# Patient Record
Sex: FEMALE | Race: BLACK OR AFRICAN AMERICAN | NOT HISPANIC OR LATINO
[De-identification: names, ages, dates, MRNs, and addresses within clinical notes are randomized per-mention and may not be internally consistent; named-entity substitution may affect disease eponyms.]

---

## 2018-11-06 ENCOUNTER — RESULT REVIEW (OUTPATIENT)
Age: 43
End: 2018-11-06

## 2018-11-06 ENCOUNTER — LABORATORY RESULT (OUTPATIENT)
Age: 43
End: 2018-11-06

## 2018-11-06 ENCOUNTER — OUTPATIENT (OUTPATIENT)
Dept: OUTPATIENT SERVICES | Facility: HOSPITAL | Age: 43
LOS: 1 days | End: 2018-11-06

## 2018-11-06 ENCOUNTER — APPOINTMENT (OUTPATIENT)
Dept: OBGYN | Facility: HOSPITAL | Age: 43
End: 2018-11-06
Payer: MEDICAID

## 2018-11-06 VITALS
SYSTOLIC BLOOD PRESSURE: 105 MMHG | DIASTOLIC BLOOD PRESSURE: 58 MMHG | BODY MASS INDEX: 19.93 KG/M2 | WEIGHT: 127 LBS | HEART RATE: 94 BPM | HEIGHT: 67 IN

## 2018-11-06 PROCEDURE — 99386 PREV VISIT NEW AGE 40-64: CPT | Mod: GC

## 2018-11-06 PROCEDURE — 99214 OFFICE O/P EST MOD 30 MIN: CPT | Mod: GC,25

## 2018-11-07 DIAGNOSIS — R10.2 PELVIC AND PERINEAL PAIN: ICD-10-CM

## 2018-11-07 DIAGNOSIS — Z01.419 ENCOUNTER FOR GYNECOLOGICAL EXAMINATION (GENERAL) (ROUTINE) WITHOUT ABNORMAL FINDINGS: ICD-10-CM

## 2018-11-07 LAB
C TRACH RRNA SPEC QL NAA+PROBE: SIGNIFICANT CHANGE UP
CANDIDA AB TITR SER: NOT DETECTED — SIGNIFICANT CHANGE UP
G VAGINALIS DNA SPEC QL NAA+PROBE: DETECTED — SIGNIFICANT CHANGE UP
HBV SURFACE AB SER-ACNC: REACTIVE — SIGNIFICANT CHANGE UP
HBV SURFACE AG SER-ACNC: NONREACTIVE — SIGNIFICANT CHANGE UP
HCV AB S/CO SERPL IA: 0.2 S/CO — SIGNIFICANT CHANGE UP
HCV AB SERPL-IMP: SIGNIFICANT CHANGE UP
HCV RNA SERPL NAA DL=5-ACNC: NOT DETECTED — SIGNIFICANT CHANGE UP
HCV RNA SPEC NAA+PROBE-LOG IU: SIGNIFICANT CHANGE UP LOGIU/ML
HIV 1+2 AB+HIV1 P24 AG SERPL QL IA: SIGNIFICANT CHANGE UP
HPV HIGH+LOW RISK DNA PNL CVX: SIGNIFICANT CHANGE UP
N GONORRHOEA RRNA SPEC QL NAA+PROBE: SIGNIFICANT CHANGE UP
SPECIMEN SOURCE: SIGNIFICANT CHANGE UP
T PALLIDUM AB TITR SER: NEGATIVE — SIGNIFICANT CHANGE UP
T VAGINALIS SPEC QL WET PREP: NOT DETECTED — SIGNIFICANT CHANGE UP

## 2018-11-09 LAB — CYTOLOGY SPEC DOC CYTO: SIGNIFICANT CHANGE UP

## 2018-12-04 ENCOUNTER — APPOINTMENT (OUTPATIENT)
Dept: OBGYN | Facility: HOSPITAL | Age: 43
End: 2018-12-04

## 2019-01-16 ENCOUNTER — LABORATORY RESULT (OUTPATIENT)
Age: 44
End: 2019-01-16

## 2019-01-16 ENCOUNTER — APPOINTMENT (OUTPATIENT)
Dept: INTERNAL MEDICINE | Facility: HOSPITAL | Age: 44
End: 2019-01-16
Payer: MEDICAID

## 2019-01-16 ENCOUNTER — OUTPATIENT (OUTPATIENT)
Dept: OUTPATIENT SERVICES | Facility: HOSPITAL | Age: 44
LOS: 1 days | End: 2019-01-16

## 2019-01-16 VITALS — BODY MASS INDEX: 23.55 KG/M2 | HEIGHT: 62 IN | WEIGHT: 128 LBS

## 2019-01-16 VITALS — HEART RATE: 70 BPM | DIASTOLIC BLOOD PRESSURE: 70 MMHG | SYSTOLIC BLOOD PRESSURE: 105 MMHG

## 2019-01-16 DIAGNOSIS — B96.89 ACUTE VAGINITIS: ICD-10-CM

## 2019-01-16 DIAGNOSIS — Z00.00 ENCOUNTER FOR GENERAL ADULT MEDICAL EXAMINATION W/OUT ABNORMAL FINDINGS: ICD-10-CM

## 2019-01-16 DIAGNOSIS — N83.291 OTHER OVARIAN CYST, RIGHT SIDE: ICD-10-CM

## 2019-01-16 DIAGNOSIS — N76.0 ACUTE VAGINITIS: ICD-10-CM

## 2019-01-16 DIAGNOSIS — R10.30 LOWER ABDOMINAL PAIN, UNSPECIFIED: ICD-10-CM

## 2019-01-16 LAB
ALBUMIN SERPL ELPH-MCNC: 5 G/DL — SIGNIFICANT CHANGE UP (ref 3.3–5)
ALP SERPL-CCNC: 60 U/L — SIGNIFICANT CHANGE UP (ref 40–120)
ALT FLD-CCNC: 27 U/L — SIGNIFICANT CHANGE UP (ref 4–33)
ANION GAP SERPL CALC-SCNC: 16 MMO/L — HIGH (ref 7–14)
AST SERPL-CCNC: 24 U/L — SIGNIFICANT CHANGE UP (ref 4–32)
BASOPHILS # BLD AUTO: 0.03 K/UL — SIGNIFICANT CHANGE UP (ref 0–0.2)
BASOPHILS NFR BLD AUTO: 0.7 % — SIGNIFICANT CHANGE UP (ref 0–2)
BILIRUB SERPL-MCNC: 0.3 MG/DL — SIGNIFICANT CHANGE UP (ref 0.2–1.2)
BUN SERPL-MCNC: 7 MG/DL — SIGNIFICANT CHANGE UP (ref 7–23)
CALCIUM SERPL-MCNC: 10.1 MG/DL — SIGNIFICANT CHANGE UP (ref 8.4–10.5)
CHLORIDE SERPL-SCNC: 100 MMOL/L — SIGNIFICANT CHANGE UP (ref 98–107)
CHOLEST SERPL-MCNC: 202 MG/DL — HIGH (ref 120–199)
CO2 SERPL-SCNC: 26 MMOL/L — SIGNIFICANT CHANGE UP (ref 22–31)
CREAT SERPL-MCNC: 0.83 MG/DL — SIGNIFICANT CHANGE UP (ref 0.5–1.3)
EOSINOPHIL # BLD AUTO: 0.03 K/UL — SIGNIFICANT CHANGE UP (ref 0–0.5)
EOSINOPHIL NFR BLD AUTO: 0.7 % — SIGNIFICANT CHANGE UP (ref 0–6)
GLUCOSE SERPL-MCNC: 86 MG/DL — SIGNIFICANT CHANGE UP (ref 70–99)
HBA1C BLD-MCNC: 5.7 % — HIGH (ref 4–5.6)
HCT VFR BLD CALC: 39.7 % — SIGNIFICANT CHANGE UP (ref 34.5–45)
HDLC SERPL-MCNC: 61 MG/DL — SIGNIFICANT CHANGE UP (ref 45–65)
HGB BLD-MCNC: 12.2 G/DL — SIGNIFICANT CHANGE UP (ref 11.5–15.5)
IMM GRANULOCYTES NFR BLD AUTO: 0.2 % — SIGNIFICANT CHANGE UP (ref 0–1.5)
LIPID PNL WITH DIRECT LDL SERPL: 141 MG/DL — SIGNIFICANT CHANGE UP
LYMPHOCYTES # BLD AUTO: 1.47 K/UL — SIGNIFICANT CHANGE UP (ref 1–3.3)
LYMPHOCYTES # BLD AUTO: 36.6 % — SIGNIFICANT CHANGE UP (ref 13–44)
MAGNESIUM SERPL-MCNC: 1.9 MG/DL — SIGNIFICANT CHANGE UP (ref 1.6–2.6)
MCHC RBC-ENTMCNC: 27.2 PG — SIGNIFICANT CHANGE UP (ref 27–34)
MCHC RBC-ENTMCNC: 30.7 % — LOW (ref 32–36)
MCV RBC AUTO: 88.6 FL — SIGNIFICANT CHANGE UP (ref 80–100)
MONOCYTES # BLD AUTO: 0.3 K/UL — SIGNIFICANT CHANGE UP (ref 0–0.9)
MONOCYTES NFR BLD AUTO: 7.5 % — SIGNIFICANT CHANGE UP (ref 2–14)
NEUTROPHILS # BLD AUTO: 2.18 K/UL — SIGNIFICANT CHANGE UP (ref 1.8–7.4)
NEUTROPHILS NFR BLD AUTO: 54.3 % — SIGNIFICANT CHANGE UP (ref 43–77)
NRBC # FLD: 0 K/UL — LOW (ref 25–125)
PLATELET # BLD AUTO: 251 K/UL — SIGNIFICANT CHANGE UP (ref 150–400)
PMV BLD: 11.4 FL — SIGNIFICANT CHANGE UP (ref 7–13)
POTASSIUM SERPL-MCNC: 4.1 MMOL/L — SIGNIFICANT CHANGE UP (ref 3.5–5.3)
POTASSIUM SERPL-SCNC: 4.1 MMOL/L — SIGNIFICANT CHANGE UP (ref 3.5–5.3)
PROT SERPL-MCNC: 8.5 G/DL — HIGH (ref 6–8.3)
RBC # BLD: 4.48 M/UL — SIGNIFICANT CHANGE UP (ref 3.8–5.2)
RBC # FLD: 13.4 % — SIGNIFICANT CHANGE UP (ref 10.3–14.5)
SODIUM SERPL-SCNC: 142 MMOL/L — SIGNIFICANT CHANGE UP (ref 135–145)
TRIGL SERPL-MCNC: 83 MG/DL — SIGNIFICANT CHANGE UP (ref 10–149)
WBC # BLD: 4.02 K/UL — SIGNIFICANT CHANGE UP (ref 3.8–10.5)
WBC # FLD AUTO: 4.02 K/UL — SIGNIFICANT CHANGE UP (ref 3.8–10.5)

## 2019-01-16 PROCEDURE — 99204 OFFICE O/P NEW MOD 45 MIN: CPT | Mod: GC

## 2019-01-16 RX ORDER — IBUPROFEN 200 MG/1
200 TABLET ORAL 3 TIMES DAILY
Refills: 0 | Status: ACTIVE | COMMUNITY
Start: 2019-01-16

## 2019-01-16 RX ORDER — METRONIDAZOLE 500 MG/1
500 TABLET ORAL TWICE DAILY
Qty: 14 | Refills: 0 | Status: COMPLETED | COMMUNITY
Start: 2018-11-13 | End: 2018-11-29

## 2019-01-17 PROBLEM — N76.0 BACTERIAL VAGINOSIS: Status: RESOLVED | Noted: 2018-11-13 | Resolved: 2019-01-17

## 2019-01-17 PROBLEM — Z00.00 ENCOUNTER FOR PREVENTIVE HEALTH EXAMINATION: Status: ACTIVE | Noted: 2018-10-01

## 2019-01-17 PROBLEM — R10.30 LOWER ABDOMINAL PAIN: Status: ACTIVE | Noted: 2019-01-17

## 2019-01-19 ENCOUNTER — APPOINTMENT (OUTPATIENT)
Dept: MAMMOGRAPHY | Facility: IMAGING CENTER | Age: 44
End: 2019-01-19
Payer: COMMERCIAL

## 2019-01-19 ENCOUNTER — OUTPATIENT (OUTPATIENT)
Dept: OUTPATIENT SERVICES | Facility: HOSPITAL | Age: 44
LOS: 1 days | End: 2019-01-19
Payer: COMMERCIAL

## 2019-01-19 ENCOUNTER — APPOINTMENT (OUTPATIENT)
Dept: ULTRASOUND IMAGING | Facility: IMAGING CENTER | Age: 44
End: 2019-01-19

## 2019-01-19 DIAGNOSIS — Z01.419 ENCOUNTER FOR GYNECOLOGICAL EXAMINATION (GENERAL) (ROUTINE) WITHOUT ABNORMAL FINDINGS: ICD-10-CM

## 2019-01-19 PROCEDURE — 77063 BREAST TOMOSYNTHESIS BI: CPT

## 2019-01-19 PROCEDURE — 77063 BREAST TOMOSYNTHESIS BI: CPT | Mod: 26

## 2019-01-19 PROCEDURE — 77067 SCR MAMMO BI INCL CAD: CPT | Mod: 26

## 2019-01-19 PROCEDURE — 77067 SCR MAMMO BI INCL CAD: CPT

## 2019-01-26 ENCOUNTER — APPOINTMENT (OUTPATIENT)
Dept: ULTRASOUND IMAGING | Facility: IMAGING CENTER | Age: 44
End: 2019-01-26
Payer: COMMERCIAL

## 2019-01-26 ENCOUNTER — OUTPATIENT (OUTPATIENT)
Dept: OUTPATIENT SERVICES | Facility: HOSPITAL | Age: 44
LOS: 1 days | End: 2019-01-26
Payer: COMMERCIAL

## 2019-01-26 DIAGNOSIS — Z01.419 ENCOUNTER FOR GYNECOLOGICAL EXAMINATION (GENERAL) (ROUTINE) WITHOUT ABNORMAL FINDINGS: ICD-10-CM

## 2019-01-26 PROCEDURE — 76830 TRANSVAGINAL US NON-OB: CPT | Mod: 26

## 2019-01-26 PROCEDURE — 76830 TRANSVAGINAL US NON-OB: CPT

## 2019-01-27 NOTE — REVIEW OF SYSTEMS
[Abdominal Pain] : abdominal pain [Fever] : no fever [Chills] : no chills [Fatigue] : no fatigue [Chest Pain] : no chest pain [Palpitations] : no palpitations [Lower Ext Edema] : no lower extremity edema [Shortness Of Breath] : no shortness of breath [Wheezing] : no wheezing [Cough] : no cough [Dyspnea on Exertion] : no dyspnea on exertion [Nausea] : no nausea [Constipation] : no constipation [Diarrhea] : diarrhea [Vomiting] : no vomiting [Dysuria] : no dysuria [Frequency] : no frequency [Joint Pain] : no joint pain [Muscle Pain] : no muscle pain [Itching] : no itching [Skin Rash] : no skin rash [Headache] : no headache [Dizziness] : no dizziness [Depression] : no depression

## 2019-01-27 NOTE — ASSESSMENT
[FreeTextEntry1] : 43 years old with PMH of leiomyomatous s/p myomectomy and endometrial ablation presenting as a new patient for abdominal pain\par \par #Lower abdominal pain\par -Most likely 2/2 to leiomyomas given no GI or urological issues as well firm round mass felt on exam when pressed causing tenderness\par -Will defer management to gyn for now\par -Ibuprofen for pain\par \par #leiomyoma\par -Pt with several leiomyomas being followed by ob/gyn\par -Will defer management to ob/gyn\par -c/w ibuprofen for pain\par -GYN recommending against pregnancy at the moment given surgical hx\par \par #HCM\par -refusing flu shot today\par -tdap at next visit\par -pt scheduled for mammogram on the 19th\par -UTd on Pap smear, negative\par -A1c, lipid panel, CBC\par \par Forrest Spencer M.D. PGY2\par Firm 5\par \par RTC in 3 months

## 2019-01-27 NOTE — PHYSICAL EXAM
[No Acute Distress] : no acute distress [Well Nourished] : well nourished [Normal Sclera/Conjunctiva] : normal sclera/conjunctiva [EOMI] : extraocular movements intact [Normal Oropharynx] : the oropharynx was normal [No Lymphadenopathy] : no lymphadenopathy [No Respiratory Distress] : no respiratory distress  [Clear to Auscultation] : lungs were clear to auscultation bilaterally [Normal Rate] : normal rate  [Regular Rhythm] : with a regular rhythm [Normal S1, S2] : normal S1 and S2 [No Edema] : there was no peripheral edema [Soft] : abdomen soft [Non-distended] : non-distended [No HSM] : no HSM [Normal Bowel Sounds] : normal bowel sounds [Normal Anterior Cervical Nodes] : no anterior cervical lymphadenopathy [Normal Gait] : normal gait [Coordination Grossly Intact] : coordination grossly intact [Normal Affect] : the affect was normal [Normal Insight/Judgement] : insight and judgment were intact [de-identified] : Round hard mobile mass in mid lower abdomen that causes tenderness upon palpation.

## 2019-01-27 NOTE — END OF VISIT
[] : Resident [FreeTextEntry3] : Getting up to date with health maintenance. Abdominal/pelvic discomfort seems most likely to relate to leiomyomata.  Will follow with GYN.

## 2019-01-27 NOTE — HISTORY OF PRESENT ILLNESS
[FreeTextEntry1] : Abdominal pain [de-identified] : 43 years old with PMH of leiomyomatous s/p myomectomy and endometrial ablation presenting as a new patient for wellness check up. Pt states she has been doing well, has been followed closely by Ob/gyn and has no PCP. She goes on to mention abdominal pain for the past several months, located in her lower quadrant, 8/10 at its worse which is during her mensuration and starts to improve after that. It improves with ibuprofen, feels like a crampy sensation. There is no association with foods, no N/V/C/D, no dysuria, no increased frequency, hematuria, and no hematochezia. \par \par Pt states she had an ultrasound of her pelvis which showed several leiomyomas. One in the posterior region measuring 3 cm x 2.7cm and another measuring 2cm x 2.6cm. There was another in the anterior subserosal measuring 1.7cm x 1.4cm.\par \par Pt denies fevers, chills, CO, SOB, HA, edema, rashes, recent travel, and sick contacts. Pt mentions she wants to get pregnant, however ob/gyn recommending against it given her surgical hx and are currently recommending a hysterectomy.

## 2019-01-28 DIAGNOSIS — R10.30 LOWER ABDOMINAL PAIN, UNSPECIFIED: ICD-10-CM

## 2019-01-28 DIAGNOSIS — R10.2 PELVIC AND PERINEAL PAIN: ICD-10-CM

## 2019-01-28 DIAGNOSIS — Z00.00 ENCOUNTER FOR GENERAL ADULT MEDICAL EXAMINATION WITHOUT ABNORMAL FINDINGS: ICD-10-CM

## 2019-02-19 ENCOUNTER — APPOINTMENT (OUTPATIENT)
Dept: OBGYN | Facility: HOSPITAL | Age: 44
End: 2019-02-19
Payer: COMMERCIAL

## 2019-02-19 ENCOUNTER — LABORATORY RESULT (OUTPATIENT)
Age: 44
End: 2019-02-19

## 2019-02-19 ENCOUNTER — OUTPATIENT (OUTPATIENT)
Dept: OUTPATIENT SERVICES | Facility: HOSPITAL | Age: 44
LOS: 1 days | End: 2019-02-19

## 2019-02-19 VITALS
HEIGHT: 62 IN | HEART RATE: 90 BPM | DIASTOLIC BLOOD PRESSURE: 57 MMHG | BODY MASS INDEX: 23.74 KG/M2 | SYSTOLIC BLOOD PRESSURE: 96 MMHG | WEIGHT: 129 LBS

## 2019-02-19 DIAGNOSIS — R10.2 PELVIC AND PERINEAL PAIN: ICD-10-CM

## 2019-02-19 DIAGNOSIS — N83.292 OTHER OVARIAN CYST, LEFT SIDE: ICD-10-CM

## 2019-02-19 DIAGNOSIS — Z01.419 ENCOUNTER FOR GYNECOLOGICAL EXAMINATION (GENERAL) (ROUTINE) W/OUT ABNORMAL FINDINGS: ICD-10-CM

## 2019-02-19 PROCEDURE — 99213 OFFICE O/P EST LOW 20 MIN: CPT | Mod: GC

## 2019-02-19 RX ORDER — IBUPROFEN 600 MG/1
600 TABLET, FILM COATED ORAL 3 TIMES DAILY
Qty: 30 | Refills: 0 | Status: ACTIVE | COMMUNITY
Start: 2019-02-19 | End: 1900-01-01

## 2019-02-19 RX ORDER — DOXYCYCLINE HYCLATE 100 MG/1
100 CAPSULE ORAL TWICE DAILY
Qty: 10 | Refills: 0 | Status: ACTIVE | COMMUNITY
Start: 2019-02-19 | End: 1900-01-01

## 2019-02-20 DIAGNOSIS — Z01.419 ENCOUNTER FOR GYNECOLOGICAL EXAMINATION (GENERAL) (ROUTINE) WITHOUT ABNORMAL FINDINGS: ICD-10-CM

## 2019-02-20 DIAGNOSIS — R10.2 PELVIC AND PERINEAL PAIN: ICD-10-CM

## 2019-02-20 DIAGNOSIS — N83.292 OTHER OVARIAN CYST, LEFT SIDE: ICD-10-CM

## 2019-02-20 LAB
CANDIDA AB TITR SER: NOT DETECTED — SIGNIFICANT CHANGE UP
G VAGINALIS DNA SPEC QL NAA+PROBE: DETECTED — SIGNIFICANT CHANGE UP
T VAGINALIS SPEC QL WET PREP: NOT DETECTED — SIGNIFICANT CHANGE UP

## 2019-02-20 NOTE — END OF VISIT
[] : Resident [FreeTextEntry3] : given complexity of cyst and bilaterality will have patient come in 2/20 for beta hcg, ca 125, cea, ca 19-9 and will get MRI w/wo contrast of pelvis, return to acu 2 weeks for results and determine degree of concern, dx and rx plan. Once resolved will have pt get ref to [facility] as she would like to conceive. Will deal with issue above first as mentioned. \par reached pt she is coming in 2/22 for blood work and mri rx \par will see her in gyn clinic in 2-3 weeks\elvi BARNARD

## 2019-02-20 NOTE — PHYSICAL EXAM
[Awake] : awake [Alert] : alert [Soft] : soft [Oriented x3] : oriented to person, place, and time [Normal] : cervix [Labia Majora] : labia major [Labia Minora] : labia minora [Discharge] : a  ~M vaginal discharge was present [Scant] : scant [White] : white [Thin] : thin [Anteversion] : anteverted [Enlarged ___ wks] : enlarged [unfilled] ~Uweeks [Uterine Adnexae] : were not tender and not enlarged [Acute Distress] : no acute distress [Tender] : non tender [Distended] : not distended [Foul Smelling] : not foul smelling [Adnexa Tenderness On The Right] : was not tender [Adnexa Tenderness On The Left] : was not tender [FreeTextEntry7] : irregular posterior fibroid palpated in posterior fornix-tender to palpation. No R or L adnexal masses palpated

## 2019-02-22 ENCOUNTER — LABORATORY RESULT (OUTPATIENT)
Age: 44
End: 2019-02-22

## 2019-02-22 LAB
CEA SERPL-MCNC: 2.4 NG/ML — SIGNIFICANT CHANGE UP (ref 1–3.8)
HCG SERPL-ACNC: < 5 MIU/ML — SIGNIFICANT CHANGE UP

## 2019-02-23 LAB
CANCER AG125 SERPL-ACNC: 30 U/ML — SIGNIFICANT CHANGE UP
CANCER AG19-9 SERPL-ACNC: 28 U/ML — SIGNIFICANT CHANGE UP

## 2019-02-27 ENCOUNTER — TRANSCRIPTION ENCOUNTER (OUTPATIENT)
Age: 44
End: 2019-02-27

## 2019-02-27 ENCOUNTER — INPATIENT (INPATIENT)
Facility: HOSPITAL | Age: 44
LOS: 4 days | Discharge: HOME | End: 2019-03-04
Attending: SURGERY | Admitting: SURGERY
Payer: COMMERCIAL

## 2019-02-27 VITALS
HEART RATE: 91 BPM | SYSTOLIC BLOOD PRESSURE: 134 MMHG | OXYGEN SATURATION: 100 % | RESPIRATION RATE: 17 BRPM | TEMPERATURE: 98 F | DIASTOLIC BLOOD PRESSURE: 58 MMHG

## 2019-02-27 RX ORDER — MORPHINE SULFATE 50 MG/1
4 CAPSULE, EXTENDED RELEASE ORAL ONCE
Qty: 0 | Refills: 0 | Status: DISCONTINUED | OUTPATIENT
Start: 2019-02-27 | End: 2019-02-27

## 2019-02-27 NOTE — ED PROVIDER NOTE - OBJECTIVE STATEMENT
43 yr old female with PMH of fibroids, presents with abdominal pain she states from suprapubic region up towards subxyphoid process starting today.  Denies urinary complaints.  Denies fever, diarrhea.  States one episode of vomiting.  States on last day of menstrual cycle.

## 2019-02-27 NOTE — ED PROVIDER NOTE - PROGRESS NOTE DETAILS
Stefan NICOLE: Pt signed out to me. CT showing closed loop obstruction, poss ischemia.  Surgery consulted and is at bedside.  Plan to admit, consented for OR. Jude PGY-3: Pt. refusing TVUS secondary to uncontrolled pain, will try toradol, ovarian torsion remains high on differential. Jude PGY-3: Pt. with persistent pain, additional episode of vomiting, increased concern for intraabdominal pathology will CT abd/pelv. Jude PGY-3: Radiology called with prelim read on CT Scan, stating evidence of closed loop bowel obstruction with concern for ischemia. Surgery consulted.

## 2019-02-27 NOTE — ED ADULT TRIAGE NOTE - CHIEF COMPLAINT QUOTE
Pt c/o abd pain with nausea, x1 episode of vomiting since 30 min ago Also c/o chest pain with dizziness.. Denies diarrhea, fever. Pt moaning, yelling. Appears very uncomfortable in triage. Denies pmhx.

## 2019-02-28 ENCOUNTER — RESULT REVIEW (OUTPATIENT)
Age: 44
End: 2019-02-28

## 2019-02-28 DIAGNOSIS — K56.609 UNSPECIFIED INTESTINAL OBSTRUCTION, UNSPECIFIED AS TO PARTIAL VERSUS COMPLETE OBSTRUCTION: ICD-10-CM

## 2019-02-28 LAB
ALBUMIN SERPL ELPH-MCNC: 4.8 G/DL — SIGNIFICANT CHANGE UP (ref 3.3–5)
ALP SERPL-CCNC: 58 U/L — SIGNIFICANT CHANGE UP (ref 40–120)
ALT FLD-CCNC: 13 U/L — SIGNIFICANT CHANGE UP (ref 4–33)
ANION GAP SERPL CALC-SCNC: 14 MMO/L — SIGNIFICANT CHANGE UP (ref 7–14)
ANION GAP SERPL CALC-SCNC: 17 MMO/L — HIGH (ref 7–14)
APPEARANCE UR: SIGNIFICANT CHANGE UP
APTT BLD: 23 SEC — LOW (ref 27.5–36.3)
AST SERPL-CCNC: 21 U/L — SIGNIFICANT CHANGE UP (ref 4–32)
BACTERIA # UR AUTO: NEGATIVE — SIGNIFICANT CHANGE UP
BASE EXCESS BLDV CALC-SCNC: -1.9 MMOL/L — SIGNIFICANT CHANGE UP
BASE EXCESS BLDV CALC-SCNC: 0.6 MMOL/L — SIGNIFICANT CHANGE UP
BASOPHILS # BLD AUTO: 0.03 K/UL — SIGNIFICANT CHANGE UP (ref 0–0.2)
BASOPHILS NFR BLD AUTO: 0.3 % — SIGNIFICANT CHANGE UP (ref 0–2)
BILIRUB SERPL-MCNC: 0.3 MG/DL — SIGNIFICANT CHANGE UP (ref 0.2–1.2)
BILIRUB UR-MCNC: NEGATIVE — SIGNIFICANT CHANGE UP
BLD GP AB SCN SERPL QL: NEGATIVE — SIGNIFICANT CHANGE UP
BLOOD GAS VENOUS - CREATININE: 0.63 MG/DL — SIGNIFICANT CHANGE UP (ref 0.5–1.3)
BLOOD GAS VENOUS - CREATININE: 0.66 MG/DL — SIGNIFICANT CHANGE UP (ref 0.5–1.3)
BLOOD UR QL VISUAL: HIGH
BUN SERPL-MCNC: 8 MG/DL — SIGNIFICANT CHANGE UP (ref 7–23)
BUN SERPL-MCNC: 9 MG/DL — SIGNIFICANT CHANGE UP (ref 7–23)
CALCIUM SERPL-MCNC: 8.3 MG/DL — LOW (ref 8.4–10.5)
CALCIUM SERPL-MCNC: 9.9 MG/DL — SIGNIFICANT CHANGE UP (ref 8.4–10.5)
CHLORIDE BLDV-SCNC: 103 MMOL/L — SIGNIFICANT CHANGE UP (ref 96–108)
CHLORIDE BLDV-SCNC: 103 MMOL/L — SIGNIFICANT CHANGE UP (ref 96–108)
CHLORIDE SERPL-SCNC: 100 MMOL/L — SIGNIFICANT CHANGE UP (ref 98–107)
CHLORIDE SERPL-SCNC: 98 MMOL/L — SIGNIFICANT CHANGE UP (ref 98–107)
CO2 SERPL-SCNC: 20 MMOL/L — LOW (ref 22–31)
CO2 SERPL-SCNC: 22 MMOL/L — SIGNIFICANT CHANGE UP (ref 22–31)
COLOR SPEC: SIGNIFICANT CHANGE UP
CREAT SERPL-MCNC: 0.7 MG/DL — SIGNIFICANT CHANGE UP (ref 0.5–1.3)
CREAT SERPL-MCNC: 0.84 MG/DL — SIGNIFICANT CHANGE UP (ref 0.5–1.3)
EOSINOPHIL # BLD AUTO: 0.01 K/UL — SIGNIFICANT CHANGE UP (ref 0–0.5)
EOSINOPHIL NFR BLD AUTO: 0.1 % — SIGNIFICANT CHANGE UP (ref 0–6)
GAS PNL BLDV: 132 MMOL/L — LOW (ref 136–146)
GAS PNL BLDV: 136 MMOL/L — SIGNIFICANT CHANGE UP (ref 136–146)
GLUCOSE BLDV-MCNC: 136 — HIGH (ref 70–99)
GLUCOSE BLDV-MCNC: 147 — HIGH (ref 70–99)
GLUCOSE SERPL-MCNC: 128 MG/DL — HIGH (ref 70–99)
GLUCOSE SERPL-MCNC: 140 MG/DL — HIGH (ref 70–99)
GLUCOSE UR-MCNC: NEGATIVE — SIGNIFICANT CHANGE UP
HCG SERPL-ACNC: < 5 MIU/ML — SIGNIFICANT CHANGE UP
HCO3 BLDV-SCNC: 22 MMOL/L — SIGNIFICANT CHANGE UP (ref 20–27)
HCO3 BLDV-SCNC: 25 MMOL/L — SIGNIFICANT CHANGE UP (ref 20–27)
HCT VFR BLD CALC: 35.3 % — SIGNIFICANT CHANGE UP (ref 34.5–45)
HCT VFR BLD CALC: 36.8 % — SIGNIFICANT CHANGE UP (ref 34.5–45)
HCT VFR BLDV CALC: 33.3 % — LOW (ref 34.5–45)
HCT VFR BLDV CALC: 35.3 % — SIGNIFICANT CHANGE UP (ref 34.5–45)
HGB BLD-MCNC: 11.2 G/DL — LOW (ref 11.5–15.5)
HGB BLD-MCNC: 11.5 G/DL — SIGNIFICANT CHANGE UP (ref 11.5–15.5)
HGB BLDV-MCNC: 10.8 G/DL — LOW (ref 11.5–15.5)
HGB BLDV-MCNC: 11.4 G/DL — LOW (ref 11.5–15.5)
HYALINE CASTS # UR AUTO: SIGNIFICANT CHANGE UP
IMM GRANULOCYTES NFR BLD AUTO: 0.3 % — SIGNIFICANT CHANGE UP (ref 0–1.5)
INR BLD: 1 — SIGNIFICANT CHANGE UP (ref 0.88–1.17)
KETONES UR-MCNC: SIGNIFICANT CHANGE UP
LACTATE BLDV-MCNC: 2.9 MMOL/L — HIGH (ref 0.5–2)
LACTATE BLDV-MCNC: 3.1 MMOL/L — HIGH (ref 0.5–2)
LACTATE SERPL-SCNC: 2.3 MMOL/L — HIGH (ref 0.5–2)
LEUKOCYTE ESTERASE UR-ACNC: SIGNIFICANT CHANGE UP
LIDOCAIN IGE QN: 22 U/L — SIGNIFICANT CHANGE UP (ref 7–60)
LYMPHOCYTES # BLD AUTO: 0.67 K/UL — LOW (ref 1–3.3)
LYMPHOCYTES # BLD AUTO: 6.8 % — LOW (ref 13–44)
MAGNESIUM SERPL-MCNC: 1.3 MG/DL — LOW (ref 1.6–2.6)
MCHC RBC-ENTMCNC: 27.4 PG — SIGNIFICANT CHANGE UP (ref 27–34)
MCHC RBC-ENTMCNC: 27.8 PG — SIGNIFICANT CHANGE UP (ref 27–34)
MCHC RBC-ENTMCNC: 31.3 % — LOW (ref 32–36)
MCHC RBC-ENTMCNC: 31.7 % — LOW (ref 32–36)
MCV RBC AUTO: 87.6 FL — SIGNIFICANT CHANGE UP (ref 80–100)
MCV RBC AUTO: 87.8 FL — SIGNIFICANT CHANGE UP (ref 80–100)
MONOCYTES # BLD AUTO: 0.27 K/UL — SIGNIFICANT CHANGE UP (ref 0–0.9)
MONOCYTES NFR BLD AUTO: 2.8 % — SIGNIFICANT CHANGE UP (ref 2–14)
NEUTROPHILS # BLD AUTO: 8.79 K/UL — HIGH (ref 1.8–7.4)
NEUTROPHILS NFR BLD AUTO: 89.7 % — HIGH (ref 43–77)
NITRITE UR-MCNC: NEGATIVE — SIGNIFICANT CHANGE UP
NRBC # FLD: 0 K/UL — LOW (ref 25–125)
NRBC # FLD: 0 K/UL — LOW (ref 25–125)
PCO2 BLDV: 37 MMHG — LOW (ref 41–51)
PCO2 BLDV: 45 MMHG — SIGNIFICANT CHANGE UP (ref 41–51)
PH BLDV: 7.33 PH — SIGNIFICANT CHANGE UP (ref 7.32–7.43)
PH BLDV: 7.43 PH — SIGNIFICANT CHANGE UP (ref 7.32–7.43)
PH UR: 8.5 — HIGH (ref 5–8)
PHOSPHATE SERPL-MCNC: 3.7 MG/DL — SIGNIFICANT CHANGE UP (ref 2.5–4.5)
PLATELET # BLD AUTO: 210 K/UL — SIGNIFICANT CHANGE UP (ref 150–400)
PLATELET # BLD AUTO: 246 K/UL — SIGNIFICANT CHANGE UP (ref 150–400)
PMV BLD: 10.9 FL — SIGNIFICANT CHANGE UP (ref 7–13)
PMV BLD: 11 FL — SIGNIFICANT CHANGE UP (ref 7–13)
PO2 BLDV: 23 MMHG — LOW (ref 35–40)
PO2 BLDV: 50 MMHG — HIGH (ref 35–40)
POTASSIUM BLDV-SCNC: 3.3 MMOL/L — LOW (ref 3.4–4.5)
POTASSIUM BLDV-SCNC: 3.6 MMOL/L — SIGNIFICANT CHANGE UP (ref 3.4–4.5)
POTASSIUM SERPL-MCNC: 3.4 MMOL/L — LOW (ref 3.5–5.3)
POTASSIUM SERPL-MCNC: 4.1 MMOL/L — SIGNIFICANT CHANGE UP (ref 3.5–5.3)
POTASSIUM SERPL-SCNC: 3.4 MMOL/L — LOW (ref 3.5–5.3)
POTASSIUM SERPL-SCNC: 4.1 MMOL/L — SIGNIFICANT CHANGE UP (ref 3.5–5.3)
PROT SERPL-MCNC: 8.3 G/DL — SIGNIFICANT CHANGE UP (ref 6–8.3)
PROT UR-MCNC: 70 — SIGNIFICANT CHANGE UP
PROTHROM AB SERPL-ACNC: 11.4 SEC — SIGNIFICANT CHANGE UP (ref 9.8–13.1)
RBC # BLD: 4.03 M/UL — SIGNIFICANT CHANGE UP (ref 3.8–5.2)
RBC # BLD: 4.19 M/UL — SIGNIFICANT CHANGE UP (ref 3.8–5.2)
RBC # FLD: 12.9 % — SIGNIFICANT CHANGE UP (ref 10.3–14.5)
RBC # FLD: 13.2 % — SIGNIFICANT CHANGE UP (ref 10.3–14.5)
RBC CASTS # UR COMP ASSIST: HIGH (ref 0–?)
RH IG SCN BLD-IMP: POSITIVE — SIGNIFICANT CHANGE UP
RH IG SCN BLD-IMP: POSITIVE — SIGNIFICANT CHANGE UP
SAO2 % BLDV: 32 % — LOW (ref 60–85)
SAO2 % BLDV: 85.1 % — HIGH (ref 60–85)
SODIUM SERPL-SCNC: 134 MMOL/L — LOW (ref 135–145)
SODIUM SERPL-SCNC: 137 MMOL/L — SIGNIFICANT CHANGE UP (ref 135–145)
SP GR SPEC: 1.02 — SIGNIFICANT CHANGE UP (ref 1–1.04)
SQUAMOUS # UR AUTO: SIGNIFICANT CHANGE UP
UROBILINOGEN FLD QL: NORMAL — SIGNIFICANT CHANGE UP
WBC # BLD: 9.08 K/UL — SIGNIFICANT CHANGE UP (ref 3.8–10.5)
WBC # BLD: 9.8 K/UL — SIGNIFICANT CHANGE UP (ref 3.8–10.5)
WBC # FLD AUTO: 9.08 K/UL — SIGNIFICANT CHANGE UP (ref 3.8–10.5)
WBC # FLD AUTO: 9.8 K/UL — SIGNIFICANT CHANGE UP (ref 3.8–10.5)
WBC UR QL: HIGH (ref 0–?)

## 2019-02-28 PROCEDURE — 44120 REMOVAL OF SMALL INTESTINE: CPT

## 2019-02-28 PROCEDURE — 88307 TISSUE EXAM BY PATHOLOGIST: CPT | Mod: 26

## 2019-02-28 PROCEDURE — 74177 CT ABD & PELVIS W/CONTRAST: CPT | Mod: 26

## 2019-02-28 PROCEDURE — 88302 TISSUE EXAM BY PATHOLOGIST: CPT | Mod: 26

## 2019-02-28 PROCEDURE — 76830 TRANSVAGINAL US NON-OB: CPT | Mod: 26

## 2019-02-28 PROCEDURE — 88305 TISSUE EXAM BY PATHOLOGIST: CPT | Mod: 26

## 2019-02-28 PROCEDURE — 99222 1ST HOSP IP/OBS MODERATE 55: CPT | Mod: 57

## 2019-02-28 PROCEDURE — 58140 MYOMECTOMY ABDOM METHOD: CPT | Mod: 59

## 2019-02-28 PROCEDURE — 71045 X-RAY EXAM CHEST 1 VIEW: CPT | Mod: 26

## 2019-02-28 PROCEDURE — 44955 APPENDECTOMY ADD-ON: CPT

## 2019-02-28 RX ORDER — HYDROMORPHONE HYDROCHLORIDE 2 MG/ML
0.5 INJECTION INTRAMUSCULAR; INTRAVENOUS; SUBCUTANEOUS
Qty: 0 | Refills: 0 | Status: DISCONTINUED | OUTPATIENT
Start: 2019-02-28 | End: 2019-02-28

## 2019-02-28 RX ORDER — SODIUM CHLORIDE 9 MG/ML
1000 INJECTION INTRAMUSCULAR; INTRAVENOUS; SUBCUTANEOUS ONCE
Qty: 0 | Refills: 0 | Status: COMPLETED | OUTPATIENT
Start: 2019-02-28 | End: 2019-02-28

## 2019-02-28 RX ORDER — PANTOPRAZOLE SODIUM 20 MG/1
40 TABLET, DELAYED RELEASE ORAL DAILY
Qty: 0 | Refills: 0 | Status: DISCONTINUED | OUTPATIENT
Start: 2019-02-28 | End: 2019-03-02

## 2019-02-28 RX ORDER — ACETAMINOPHEN 500 MG
1000 TABLET ORAL ONCE
Qty: 0 | Refills: 0 | Status: COMPLETED | OUTPATIENT
Start: 2019-03-01 | End: 2019-03-01

## 2019-02-28 RX ORDER — ENOXAPARIN SODIUM 100 MG/ML
40 INJECTION SUBCUTANEOUS DAILY
Qty: 0 | Refills: 0 | Status: DISCONTINUED | OUTPATIENT
Start: 2019-02-28 | End: 2019-03-04

## 2019-02-28 RX ORDER — HYDROMORPHONE HYDROCHLORIDE 2 MG/ML
1 INJECTION INTRAMUSCULAR; INTRAVENOUS; SUBCUTANEOUS ONCE
Qty: 0 | Refills: 0 | Status: DISCONTINUED | OUTPATIENT
Start: 2019-02-28 | End: 2019-02-28

## 2019-02-28 RX ORDER — MORPHINE SULFATE 50 MG/1
4 CAPSULE, EXTENDED RELEASE ORAL ONCE
Qty: 0 | Refills: 0 | Status: DISCONTINUED | OUTPATIENT
Start: 2019-02-28 | End: 2019-02-28

## 2019-02-28 RX ORDER — KETOROLAC TROMETHAMINE 30 MG/ML
15 SYRINGE (ML) INJECTION ONCE
Qty: 0 | Refills: 0 | Status: DISCONTINUED | OUTPATIENT
Start: 2019-02-28 | End: 2019-02-28

## 2019-02-28 RX ORDER — ONDANSETRON 8 MG/1
4 TABLET, FILM COATED ORAL ONCE
Qty: 0 | Refills: 0 | Status: COMPLETED | OUTPATIENT
Start: 2019-02-28 | End: 2019-02-28

## 2019-02-28 RX ORDER — ACETAMINOPHEN 500 MG
1000 TABLET ORAL ONCE
Qty: 0 | Refills: 0 | Status: COMPLETED | OUTPATIENT
Start: 2019-02-28 | End: 2019-02-28

## 2019-02-28 RX ORDER — SODIUM CHLORIDE 9 MG/ML
1000 INJECTION, SOLUTION INTRAVENOUS
Qty: 0 | Refills: 0 | Status: DISCONTINUED | OUTPATIENT
Start: 2019-02-28 | End: 2019-03-01

## 2019-02-28 RX ORDER — ONDANSETRON 8 MG/1
4 TABLET, FILM COATED ORAL ONCE
Qty: 0 | Refills: 0 | Status: DISCONTINUED | OUTPATIENT
Start: 2019-02-28 | End: 2019-02-28

## 2019-02-28 RX ORDER — KETOROLAC TROMETHAMINE 30 MG/ML
15 SYRINGE (ML) INJECTION EVERY 6 HOURS
Qty: 0 | Refills: 0 | Status: DISCONTINUED | OUTPATIENT
Start: 2019-02-28 | End: 2019-03-01

## 2019-02-28 RX ORDER — HYDROMORPHONE HYDROCHLORIDE 2 MG/ML
0.5 INJECTION INTRAMUSCULAR; INTRAVENOUS; SUBCUTANEOUS EVERY 4 HOURS
Qty: 0 | Refills: 0 | Status: DISCONTINUED | OUTPATIENT
Start: 2019-02-28 | End: 2019-03-02

## 2019-02-28 RX ORDER — HYDROMORPHONE HYDROCHLORIDE 2 MG/ML
0.25 INJECTION INTRAMUSCULAR; INTRAVENOUS; SUBCUTANEOUS
Qty: 0 | Refills: 0 | Status: DISCONTINUED | OUTPATIENT
Start: 2019-02-28 | End: 2019-02-28

## 2019-02-28 RX ADMIN — Medication 15 MILLIGRAM(S): at 03:09

## 2019-02-28 RX ADMIN — ENOXAPARIN SODIUM 40 MILLIGRAM(S): 100 INJECTION SUBCUTANEOUS at 22:20

## 2019-02-28 RX ADMIN — Medication 15 MILLIGRAM(S): at 18:48

## 2019-02-28 RX ADMIN — MORPHINE SULFATE 4 MILLIGRAM(S): 50 CAPSULE, EXTENDED RELEASE ORAL at 01:00

## 2019-02-28 RX ADMIN — SODIUM CHLORIDE 1000 MILLILITER(S): 9 INJECTION INTRAMUSCULAR; INTRAVENOUS; SUBCUTANEOUS at 03:18

## 2019-02-28 RX ADMIN — SODIUM CHLORIDE 1000 MILLILITER(S): 9 INJECTION INTRAMUSCULAR; INTRAVENOUS; SUBCUTANEOUS at 01:06

## 2019-02-28 RX ADMIN — ONDANSETRON 4 MILLIGRAM(S): 8 TABLET, FILM COATED ORAL at 01:07

## 2019-02-28 RX ADMIN — Medication 15 MILLIGRAM(S): at 03:17

## 2019-02-28 RX ADMIN — Medication 1000 MILLIGRAM(S): at 03:08

## 2019-02-28 RX ADMIN — MORPHINE SULFATE 4 MILLIGRAM(S): 50 CAPSULE, EXTENDED RELEASE ORAL at 00:42

## 2019-02-28 RX ADMIN — Medication 400 MILLIGRAM(S): at 17:48

## 2019-02-28 RX ADMIN — Medication 15 MILLIGRAM(S): at 01:23

## 2019-02-28 RX ADMIN — Medication 400 MILLIGRAM(S): at 01:25

## 2019-02-28 RX ADMIN — SODIUM CHLORIDE 1000 MILLILITER(S): 9 INJECTION INTRAMUSCULAR; INTRAVENOUS; SUBCUTANEOUS at 04:00

## 2019-02-28 RX ADMIN — HYDROMORPHONE HYDROCHLORIDE 1 MILLIGRAM(S): 2 INJECTION INTRAMUSCULAR; INTRAVENOUS; SUBCUTANEOUS at 23:00

## 2019-02-28 RX ADMIN — Medication 1000 MILLIGRAM(S): at 18:48

## 2019-02-28 RX ADMIN — MORPHINE SULFATE 4 MILLIGRAM(S): 50 CAPSULE, EXTENDED RELEASE ORAL at 07:16

## 2019-02-28 RX ADMIN — Medication 15 MILLIGRAM(S): at 17:47

## 2019-02-28 RX ADMIN — PANTOPRAZOLE SODIUM 40 MILLIGRAM(S): 20 TABLET, DELAYED RELEASE ORAL at 17:47

## 2019-02-28 RX ADMIN — HYDROMORPHONE HYDROCHLORIDE 1 MILLIGRAM(S): 2 INJECTION INTRAMUSCULAR; INTRAVENOUS; SUBCUTANEOUS at 22:20

## 2019-02-28 RX ADMIN — ONDANSETRON 4 MILLIGRAM(S): 8 TABLET, FILM COATED ORAL at 03:18

## 2019-02-28 RX ADMIN — SODIUM CHLORIDE 100 MILLILITER(S): 9 INJECTION, SOLUTION INTRAVENOUS at 13:15

## 2019-02-28 RX ADMIN — Medication 15 MILLIGRAM(S): at 03:30

## 2019-02-28 NOTE — BRIEF OPERATIVE NOTE - PRE-OP DX
Mesenteric ischemia  02/28/2019    Active  Jyothi Soliz  Small bowel obstruction  02/28/2019    Active  Jyothi Soliz

## 2019-02-28 NOTE — BRIEF OPERATIVE NOTE - PROCEDURE
<<-----Click on this checkbox to enter Procedure Hysterorrhaphy  02/28/2019    Active  [id number]  Myomectomy  02/28/2019    Active  [id number]  Appendectomy  02/28/2019    Active  [id number]  Small bowel resection  02/28/2019    Active  [id number]  Lysis of adhesions  02/28/2019    Active  [id number]  Exploratory laparotomy  02/28/2019    Active  [id number]

## 2019-02-28 NOTE — BRIEF OPERATIVE NOTE - OPERATION/FINDINGS
exploratory laparotomy via midline incision -- bloody ascites encountered immediately upon entering the abdomen  closed loop SBO due to dense adhesive bands of small bowel/mesentery to uterus and RLQ  ~120 cm frankly necrotic distal small bowel resected; side-to-side stapled anastomosis performed in Edu fashion ~ 15 cm proximal to ileocecal valve; good blood supply, mesenteric defect closed  appendectomy  uterine submucosal myomectomy of involved fibroid w/hysterorrhaphy  abdomen copiously irrigated with normal saline  fascia closed with interrupted maxon suture and skin closed with running subcuticular monocryl

## 2019-02-28 NOTE — H&P ADULT - NSHPPHYSICALEXAM_GEN_ALL_CORE
General: Alert, appears to be in pain  Neuro: No focal deficits, A+Ox3  HEENT: NC/AT, no asymmetry, no scleral icterus  Cardio: RRR, nml S1/S2  Resp: Airway patent, unlabored breathing  GI/Abd: Soft, very tender in periumbilical area, nondistended, no guarding or rebound  Ext: Warm, no edema, full ROM

## 2019-02-28 NOTE — CHART NOTE - NSCHARTNOTEFT_GEN_A_CORE
B TEAM SURGERY POST-OPERATIVE NOTE    Subjective:  Patient is s/p ex-lap RONIT, SBR (~120 cm frankly necrotic distal small bowel) side to side anastomosis 15 cm from IC valve and uterine submucosal myomectomy of involved fibroid w/hysterorrhaphy (2/28). Patient tolerated the procedure well and was transferred to PACU without any issues. She is having some abdominal pain at the midline incision and just received her evening doses of Toradol and Tylenol. She denies nausea or vomiting, she denies chest pain, or SOB. She has been making appropriate urine since the procedure.    Objective:  Vital Signs Last 24 Hrs  T(C): 37.2 (28 Feb 2019 17:49), Max: 37.2 (28 Feb 2019 17:49)  T(F): 99 (28 Feb 2019 17:49), Max: 99 (28 Feb 2019 17:49)  HR: 106 (28 Feb 2019 17:49) (76 - 106)  BP: 112/66 (28 Feb 2019 17:49) (112/66 - 143/88)  BP(mean): 86 (28 Feb 2019 17:00) (86 - 96)  RR: 16 (28 Feb 2019 17:49) (12 - 18)  SpO2: 100% (28 Feb 2019 17:49) (98% - 100%)  I&O's Detail    28 Feb 2019 07:01  -  28 Feb 2019 18:21  --------------------------------------------------------  IN:    lactated ringers.: 400 mL  Total IN: 400 mL    OUT:    Indwelling Catheter - Urethral: 650 mL    Nasoenteral Tube: 200 mL  Total OUT: 850 mL    Total NET: -450 mL    enoxaparin Injectable 40    Physical Exam:  General: NAD, resting comfortably in bed, NGT in place with bilious output  Pulmonary: Nonlabored breathing, no respiratory distress on RA  Cardiovascular: pulse is regular, borderline tachycardic  Abdominal: softly distended, appropriately tender around midline incisions, some swelling just lateral to the superior third of the incision, midline incision is clean and dry with dermabond in place  Extremities: WWP  : monzon in place draining clear urine    LABS:                        11.5   9.08  )-----------( 210      ( 28 Feb 2019 13:50 )             36.8     02-28    134<L>  |  100  |  8   ----------------------------<  128<H>  4.1   |  20<L>  |  0.70    Ca    8.3<L>      28 Feb 2019 13:50  Phos  3.7     02-28  Mg     1.3     02-28    TPro  8.3  /  Alb  4.8  /  TBili  0.3  /  DBili  x   /  AST  21  /  ALT  13  /  AlkPhos  58  02-27    PT/INR - ( 28 Feb 2019 07:25 )   PT: 11.4 SEC;   INR: 1.00          PTT - ( 28 Feb 2019 07:25 )  PTT:23.0 SEC  CAPILLARY BLOOD GLUCOSE      POCT Blood Glucose.: 152 mg/dL (28 Feb 2019 09:13)    Assessment:  43y Female, now several hours post-op from an ex-lap RONIT, SBR (~120 cm frankly necrotic distal small bowel) side to side anastomosis 15 cm from IC valve and uterine submucosal myomectomy of involved fibroid w/ hysterorrhaphy (2/28).     Plan:  - NPO, NGT, IVF  - Pain control as needed  - LVNX for DVT ppx  - OOB and ambulating as tolerated  - F/u AM labs  - Monitor GI function      B Surgery [id number]

## 2019-02-28 NOTE — BRIEF OPERATIVE NOTE - POST-OP DX
Bowel infarction  02/28/2019    Active  Jyothi Soliz  Small bowel obstruction due to adhesions  02/28/2019    Active  Jyothi Soliz

## 2019-02-28 NOTE — ED ADULT NURSE NOTE - OBJECTIVE STATEMENT
alert c/o generalized abd pain 9/10 abd tender to touch  no c/o nausea at present  vomited at home no diarrhea

## 2019-02-28 NOTE — H&P ADULT - NSHPLABSRESULTS_GEN_ALL_CORE
T(C): 37 (02-28-19 @ 07:22), Max: 37.1 (02-28-19 @ 00:40)  HR: 76 (02-28-19 @ 07:22) (76 - 91)  BP: 139/93 (02-28-19 @ 07:22) (124/75 - 139/93)  RR: 16 (02-28-19 @ 07:22) (16 - 18)  SpO2: 100% (02-28-19 @ 07:22) (99% - 100%)    LABS:                        11.2   9.80  )-----------( 246      ( 27 Feb 2019 23:55 )             35.3     27 Feb 2019 23:55    137    |  98     |  9      ----------------------------<  140    3.4     |  22     |  0.84     Ca    9.9        27 Feb 2019 23:55    TPro  8.3    /  Alb  4.8    /  TBili  0.3    /  DBili  x      /  AST  21     /  ALT  13     /  AlkPhos  58     27 Feb 2019 23:55    PT/INR - ( 28 Feb 2019 07:25 )   PT: 11.4 SEC;   INR: 1.00          PTT - ( 28 Feb 2019 07:25 )  PTT:23.0 SEC      IMAGING    CT Abdomen and Pelvis w/ IV Cont (02.28.19 @ 06:49) >    FINDINGS:    LOWER CHEST: Within normal limits.    LIVER: Within normal limits.  BILE DUCTS: Normal caliber.  GALLBLADDER: Within normal limits.  SPLEEN: Within normal limits.  PANCREAS: Within normal limits.  ADRENALS: 1.0 cm left adrenal nodule.  KIDNEYS/URETERS: Symmetrically enhancing. No hydronephrosis.    BLADDER:Within normal limits.  REPRODUCTIVE ORGANS: The uterus is heterogeneous and enlarged with   multiple low attenuation foci throughout. There is a multiloculated   cystic structure in the region of the left adnexa measuring 4.5 x 3.5 cm.   The right adnexa is not well visualized.    BOWEL: Multiple loops of stacked, dilated small bowel with paired   transition points in the central abdomen associated with mesenteric   swirling and edema (series 2, image 76). There is a beaked appearance of   the transition point and the involved dilated loops of bowel appear   hypoenhancing compared to the adjacent decompressed small bowel.   PERITONEUM: Mesenteric edema with a small amount of ascites.  VESSELS:  Swelling of distal mesenteric vessels associatedwith the   transition point as above.  RETROPERITONEUM: No lymphadenopathy.    ABDOMINAL WALL: Small umbilical hernia containing a loop of bowel.  BONES: Within normal limits.    IMPRESSION:     Suspected closed loop obstruction with hypoenhancement concerning for   bowel ischemia. Mesenteric swirling associated with the transition point   raises the possibility of internal hernia.    Enlarged, heterogeneous uterus with a multiloculated left adnexal mass.    The adnexal finding may correspond to a multiloculated cystic structure   associated with the right ovary on prior pelvic sonogram.    These findings were discussed with Dr. Roque on 2/28/2019 at 6:55 AM.

## 2019-02-28 NOTE — H&P ADULT - HISTORY OF PRESENT ILLNESS
43F with PMH of uterine fibroids s/p open myomectomy (2016) and laser ablation (2018) presenting with one day of abdominal pain. She states the pain is severe and sharp. It started in the lower mid-abdomen and has migrated to the epigastric area. She has been nauseated and vomited ~10 times. Denies fevers/chills. Her last bowel movement was at 10pm last night and was normal. She has passed a small amount of flatus today. She takes no medications.    In the ED she is hemodynamically normal, WBC count is 9, lactate 3.1, and CT shows dilated small bowel with two transition points and hypoenhancement, as well as mesenteric swirling and edema concerning for internal hernia.

## 2019-02-28 NOTE — H&P ADULT - ATTENDING COMMENTS
Seen and examined, chart and note reviewed, case discussed with B team    Closed loop bowel obstruction  a.  In abdominal distress.  Distended and tender CT reviewed, concerning for ischemia  b.  NPO, IVF resuscitation  c.  Risks, benefits and alternatives discussed with pt and .  Patient agrees to operative exploration

## 2019-02-28 NOTE — ED ADULT NURSE REASSESSMENT NOTE - NS ED NURSE REASSESS COMMENT FT1
received report from night RN, pt with SBO admitted to surgery, NGT placed 8am, confirmatory xray complete, VSS, all belongings given to spouse, pt going to OR stat.

## 2019-03-01 LAB
ANION GAP SERPL CALC-SCNC: 9 MMO/L — SIGNIFICANT CHANGE UP (ref 7–14)
APTT BLD: 30 SEC — SIGNIFICANT CHANGE UP (ref 27.5–36.3)
BACTERIA UR CULT: SIGNIFICANT CHANGE UP
BUN SERPL-MCNC: 12 MG/DL — SIGNIFICANT CHANGE UP (ref 7–23)
CALCIUM SERPL-MCNC: 7.8 MG/DL — LOW (ref 8.4–10.5)
CHLORIDE SERPL-SCNC: 103 MMOL/L — SIGNIFICANT CHANGE UP (ref 98–107)
CO2 SERPL-SCNC: 26 MMOL/L — SIGNIFICANT CHANGE UP (ref 22–31)
CREAT SERPL-MCNC: 0.91 MG/DL — SIGNIFICANT CHANGE UP (ref 0.5–1.3)
GLUCOSE SERPL-MCNC: 115 MG/DL — HIGH (ref 70–99)
HCT VFR BLD CALC: 23 % — LOW (ref 34.5–45)
HCT VFR BLD CALC: 23.3 % — LOW (ref 34.5–45)
HCT VFR BLD CALC: 28.1 % — LOW (ref 34.5–45)
HGB BLD-MCNC: 7.5 G/DL — LOW (ref 11.5–15.5)
HGB BLD-MCNC: 7.5 G/DL — LOW (ref 11.5–15.5)
HGB BLD-MCNC: 9.5 G/DL — LOW (ref 11.5–15.5)
INR BLD: 1.17 — SIGNIFICANT CHANGE UP (ref 0.88–1.17)
LACTATE SERPL-SCNC: 1 MMOL/L — SIGNIFICANT CHANGE UP (ref 0.5–2)
MAGNESIUM SERPL-MCNC: 1.6 MG/DL — SIGNIFICANT CHANGE UP (ref 1.6–2.6)
MCHC RBC-ENTMCNC: 27.8 PG — SIGNIFICANT CHANGE UP (ref 27–34)
MCHC RBC-ENTMCNC: 27.8 PG — SIGNIFICANT CHANGE UP (ref 27–34)
MCHC RBC-ENTMCNC: 29.3 PG — SIGNIFICANT CHANGE UP (ref 27–34)
MCHC RBC-ENTMCNC: 32.2 % — SIGNIFICANT CHANGE UP (ref 32–36)
MCHC RBC-ENTMCNC: 32.6 % — SIGNIFICANT CHANGE UP (ref 32–36)
MCHC RBC-ENTMCNC: 33.8 % — SIGNIFICANT CHANGE UP (ref 32–36)
MCV RBC AUTO: 85.2 FL — SIGNIFICANT CHANGE UP (ref 80–100)
MCV RBC AUTO: 86.3 FL — SIGNIFICANT CHANGE UP (ref 80–100)
MCV RBC AUTO: 86.7 FL — SIGNIFICANT CHANGE UP (ref 80–100)
NRBC # FLD: 0 K/UL — LOW (ref 25–125)
PHOSPHATE SERPL-MCNC: 3.3 MG/DL — SIGNIFICANT CHANGE UP (ref 2.5–4.5)
PLATELET # BLD AUTO: 153 K/UL — SIGNIFICANT CHANGE UP (ref 150–400)
PLATELET # BLD AUTO: 189 K/UL — SIGNIFICANT CHANGE UP (ref 150–400)
PLATELET # BLD AUTO: 195 K/UL — SIGNIFICANT CHANGE UP (ref 150–400)
PMV BLD: 10.4 FL — SIGNIFICANT CHANGE UP (ref 7–13)
PMV BLD: 11 FL — SIGNIFICANT CHANGE UP (ref 7–13)
PMV BLD: 11.2 FL — SIGNIFICANT CHANGE UP (ref 7–13)
POTASSIUM SERPL-MCNC: 3.5 MMOL/L — SIGNIFICANT CHANGE UP (ref 3.5–5.3)
POTASSIUM SERPL-SCNC: 3.5 MMOL/L — SIGNIFICANT CHANGE UP (ref 3.5–5.3)
PROTHROM AB SERPL-ACNC: 13 SEC — SIGNIFICANT CHANGE UP (ref 9.8–13.1)
RBC # BLD: 2.7 M/UL — LOW (ref 3.8–5.2)
RBC # BLD: 2.7 M/UL — LOW (ref 3.8–5.2)
RBC # BLD: 3.24 M/UL — LOW (ref 3.8–5.2)
RBC # FLD: 12.7 % — SIGNIFICANT CHANGE UP (ref 10.3–14.5)
RBC # FLD: 13.3 % — SIGNIFICANT CHANGE UP (ref 10.3–14.5)
RBC # FLD: 13.6 % — SIGNIFICANT CHANGE UP (ref 10.3–14.5)
SODIUM SERPL-SCNC: 138 MMOL/L — SIGNIFICANT CHANGE UP (ref 135–145)
SPECIMEN SOURCE: SIGNIFICANT CHANGE UP
WBC # BLD: 6.9 K/UL — SIGNIFICANT CHANGE UP (ref 3.8–10.5)
WBC # BLD: 6.96 K/UL — SIGNIFICANT CHANGE UP (ref 3.8–10.5)
WBC # BLD: 9.09 K/UL — SIGNIFICANT CHANGE UP (ref 3.8–10.5)
WBC # FLD AUTO: 6.9 K/UL — SIGNIFICANT CHANGE UP (ref 3.8–10.5)
WBC # FLD AUTO: 6.96 K/UL — SIGNIFICANT CHANGE UP (ref 3.8–10.5)
WBC # FLD AUTO: 9.09 K/UL — SIGNIFICANT CHANGE UP (ref 3.8–10.5)

## 2019-03-01 RX ORDER — ACETAMINOPHEN 500 MG
1000 TABLET ORAL ONCE
Qty: 0 | Refills: 0 | Status: COMPLETED | OUTPATIENT
Start: 2019-03-02 | End: 2019-03-02

## 2019-03-01 RX ORDER — MAGNESIUM SULFATE 500 MG/ML
2 VIAL (ML) INJECTION ONCE
Qty: 0 | Refills: 0 | Status: COMPLETED | OUTPATIENT
Start: 2019-03-01 | End: 2019-03-01

## 2019-03-01 RX ORDER — POTASSIUM CHLORIDE 20 MEQ
10 PACKET (EA) ORAL
Qty: 0 | Refills: 0 | Status: COMPLETED | OUTPATIENT
Start: 2019-03-01 | End: 2019-03-01

## 2019-03-01 RX ORDER — SODIUM CHLORIDE 9 MG/ML
1000 INJECTION, SOLUTION INTRAVENOUS
Qty: 0 | Refills: 0 | Status: DISCONTINUED | OUTPATIENT
Start: 2019-03-01 | End: 2019-03-03

## 2019-03-01 RX ORDER — BENZOCAINE AND MENTHOL 5; 1 G/100ML; G/100ML
1 LIQUID ORAL THREE TIMES A DAY
Qty: 0 | Refills: 0 | Status: DISCONTINUED | OUTPATIENT
Start: 2019-03-01 | End: 2019-03-04

## 2019-03-01 RX ADMIN — Medication 15 MILLIGRAM(S): at 06:36

## 2019-03-01 RX ADMIN — HYDROMORPHONE HYDROCHLORIDE 0.5 MILLIGRAM(S): 2 INJECTION INTRAMUSCULAR; INTRAVENOUS; SUBCUTANEOUS at 10:00

## 2019-03-01 RX ADMIN — Medication 400 MILLIGRAM(S): at 01:10

## 2019-03-01 RX ADMIN — Medication 15 MILLIGRAM(S): at 07:05

## 2019-03-01 RX ADMIN — Medication 100 MILLIEQUIVALENT(S): at 12:40

## 2019-03-01 RX ADMIN — PANTOPRAZOLE SODIUM 40 MILLIGRAM(S): 20 TABLET, DELAYED RELEASE ORAL at 17:02

## 2019-03-01 RX ADMIN — Medication 400 MILLIGRAM(S): at 06:38

## 2019-03-01 RX ADMIN — Medication 50 GRAM(S): at 10:07

## 2019-03-01 RX ADMIN — Medication 1000 MILLIGRAM(S): at 01:46

## 2019-03-01 RX ADMIN — Medication 100 MILLIEQUIVALENT(S): at 11:18

## 2019-03-01 RX ADMIN — Medication 100 MILLIEQUIVALENT(S): at 17:03

## 2019-03-01 RX ADMIN — Medication 1000 MILLIGRAM(S): at 07:05

## 2019-03-01 RX ADMIN — Medication 400 MILLIGRAM(S): at 19:26

## 2019-03-01 RX ADMIN — ENOXAPARIN SODIUM 40 MILLIGRAM(S): 100 INJECTION SUBCUTANEOUS at 13:46

## 2019-03-01 RX ADMIN — Medication 1000 MILLIGRAM(S): at 20:14

## 2019-03-01 NOTE — PROVIDER CONTACT NOTE (OTHER) - ACTION/TREATMENT ORDERED:
LENORE García made aware. Patient put out 300 cc urine. CBC sent earlier in shift. Patient currently sleeping, A + O x 4. Will continue to monitor.

## 2019-03-01 NOTE — PROVIDER CONTACT NOTE (OTHER) - ASSESSMENT
Patient is A + O x 4, no c/o pain. BP 92/46 manual with sporadic dizziness. No c/o numbness, tingling, headache, n/v/d, or sob. + pulses in all extremities.

## 2019-03-01 NOTE — PROGRESS NOTE ADULT - SUBJECTIVE AND OBJECTIVE BOX
B Team Surgery Progress Note    Interval: No acute overnight events.    SUBJECTIVE: Patient seen and examined at the bedside. Feeling well this morning. Tolerating clear liquids without nausea or vomiting. Pain is well controlled. Has passed flatus and a bowel movement. Ambulating well.     VITALS  T(C): 36.4 (03-01-19 @ 06:33), Max: 37.2 (02-28-19 @ 17:49)  HR: 98 (03-01-19 @ 06:33) (79 - 106)  BP: 99/59 (03-01-19 @ 06:33) (96/58 - 143/88)  RR: 18 (03-01-19 @ 06:33) (12 - 18)  SpO2: 100% (03-01-19 @ 06:33) (98% - 100%)  CAPILLARY BLOOD GLUCOSE      POCT Blood Glucose.: 152 mg/dL (28 Feb 2019 09:13)    Is/Os    02-28 @ 07:01  -  03-01 @ 07:00  --------------------------------------------------------  IN:    lactated ringers.: 400 mL  Total IN: 400 mL    OUT:    Indwelling Catheter - Urethral: 1500 mL    Nasoenteral Tube: 200 mL  Total OUT: 1700 mL    Total NET: -1300 mL        PHYSICAL EXAM: ***  General: NAD, Lying in bed comfortably, alert, oriented x3  Pulm: Non-labored breathing  GI/Abd: Soft, NT/ND, no rebound/guarding, JC drain in place and collecting serosang fluid, ostomy appears viable and putting out both gas and formed stool     MEDICATIONS (STANDING): acetaminophen  IVPB .. 1000 milliGRAM(s) IV Intermittent once  enoxaparin Injectable 40 milliGRAM(s) SubCutaneous daily  ketorolac   Injectable 15 milliGRAM(s) IV Push every 6 hours  lactated ringers. 1000 milliLiter(s) IV Continuous <Continuous>  pantoprazole  Injectable 40 milliGRAM(s) IV Push daily    MEDICATIONS (PRN):HYDROmorphone  Injectable 0.5 milliGRAM(s) IV Push every 4 hours PRN breakthrough pain    LABS  CBC (02-28 @ 13:50)                              11.5                           9.08    )----------------(  210        --    % Neutrophils, --    % Lymphocytes, ANC: --                                  36.8    CBC (02-27 @ 23:55)                              11.2<L>                         9.80    )----------------(  246        89.7<H>% Neutrophils, 6.8<L>% Lymphocytes, ANC: 8.79<H>                              35.3      BMP (02-28 @ 13:50)             134<L>  |  100     |  8     		Ca++ --      Ca 8.3<L>             ---------------------------------( 128<H>		Mg 1.3<L>             4.1     |  20<L>   |  0.70  			Ph 3.7     BMP (02-27 @ 23:55)             137     |  98      |  9     		Ca++ --      Ca 9.9                ---------------------------------( 140<H>		Mg --                 3.4<L>  |  22      |  0.84  			Ph --        LFTs (02-27 @ 23:55)      TPro 8.3 / Alb 4.8 / TBili 0.3 / DBili -- / AST 21 / ALT 13 / AlkPhos 58    Coags (02-28 @ 07:25)  aPTT 23.0<L> / INR 1.00 / PT 11.4      ABG (02-28 @ 13:50)      /  /  /  /  / %     Lactate:  2.3<H>    VBG (02-28 @ 07:40)     7.33 / 45 / 23<L> / 22 / -1.9 / 32.0<L>%     Lactate: 2.9<H>  VBG (02-28 @ 00:00)     7.43 / 37<L> / 50<H> / 25 / 0.6 / 85.1<H>%     Lactate: 3.1<H>    IMAGING STUDIES B Team Surgery Progress Note    Interval: No acute overnight events.    SUBJECTIVE: Patient seen and examined at the bedside. Feeling well this morning. Has been NPO with NGT in place, denies nausea or vomiting. Pain is well controlled on current regimen. Has passed flatus small amount of flatus, has not passed a bowel movement. Has not been OOB.    VITALS  T(C): 36.4 (03-01-19 @ 06:33), Max: 37.2 (02-28-19 @ 17:49)  HR: 98 (03-01-19 @ 06:33) (79 - 106)  BP: 99/59 (03-01-19 @ 06:33) (96/58 - 143/88)  RR: 18 (03-01-19 @ 06:33) (12 - 18)  SpO2: 100% (03-01-19 @ 06:33) (98% - 100%)  CAPILLARY BLOOD GLUCOSE      POCT Blood Glucose.: 152 mg/dL (28 Feb 2019 09:13)    Is/Os    02-28 @ 07:01  -  03-01 @ 07:00  --------------------------------------------------------  IN:    lactated ringers.: 400 mL  Total IN: 400 mL    OUT:    Indwelling Catheter - Urethral: 1500 mL    Nasoenteral Tube: 200 mL  Total OUT: 1700 mL    Total NET: -1300 mL    PHYSICAL EXAM:   General: NAD, Lying in bed comfortably, alert, oriented x3 NGT in place with bilious output  Pulm: Non-labored breathing on RA  GI/Abd: Softly distended, appropriately tender around midline incision, midline incision clean and dry with dermabond in place    MEDICATIONS (STANDING): acetaminophen  IVPB .. 1000 milliGRAM(s) IV Intermittent once  enoxaparin Injectable 40 milliGRAM(s) SubCutaneous daily  ketorolac   Injectable 15 milliGRAM(s) IV Push every 6 hours  lactated ringers. 1000 milliLiter(s) IV Continuous <Continuous>  pantoprazole  Injectable 40 milliGRAM(s) IV Push daily    MEDICATIONS (PRN):HYDROmorphone  Injectable 0.5 milliGRAM(s) IV Push every 4 hours PRN breakthrough pain    LABS  CBC (02-28 @ 13:50)                              11.5                           9.08    )----------------(  210        --    % Neutrophils, --    % Lymphocytes, ANC: --                                  36.8    CBC (02-27 @ 23:55)                              11.2<L>                         9.80    )----------------(  246        89.7<H>% Neutrophils, 6.8<L>% Lymphocytes, ANC: 8.79<H>                              35.3      BMP (02-28 @ 13:50)             134<L>  |  100     |  8     		Ca++ --      Ca 8.3<L>             ---------------------------------( 128<H>		Mg 1.3<L>             4.1     |  20<L>   |  0.70  			Ph 3.7     BMP (02-27 @ 23:55)             137     |  98      |  9     		Ca++ --      Ca 9.9                ---------------------------------( 140<H>		Mg --                 3.4<L>  |  22      |  0.84  			Ph --        LFTs (02-27 @ 23:55)      TPro 8.3 / Alb 4.8 / TBili 0.3 / DBili -- / AST 21 / ALT 13 / AlkPhos 58    Coags (02-28 @ 07:25)  aPTT 23.0<L> / INR 1.00 / PT 11.4      ABG (02-28 @ 13:50)      /  /  /  /  / %     Lactate:  2.3<H>    VBG (02-28 @ 07:40)     7.33 / 45 / 23<L> / 22 / -1.9 / 32.0<L>%     Lactate: 2.9<H>  VBG (02-28 @ 00:00)     7.43 / 37<L> / 50<H> / 25 / 0.6 / 85.1<H>%     Lactate: 3.1<H>

## 2019-03-01 NOTE — PROGRESS NOTE ADULT - SUBJECTIVE AND OBJECTIVE BOX
ANESTHESIA POSTOP CHECK    43y Female POSTOP DAY 1 S/P exp lpa    Vital Signs Last 24 Hrs  T(C): 36.7 (01 Mar 2019 14:00), Max: 37.2 (28 Feb 2019 17:49)  T(F): 98 (01 Mar 2019 14:00), Max: 99 (28 Feb 2019 17:49)  HR: 101 (01 Mar 2019 14:00) (89 - 106)  BP: 96/59 (01 Mar 2019 14:00) (92/46 - 124/77)  BP(mean): 86 (28 Feb 2019 17:00) (86 - 88)  RR: 18 (01 Mar 2019 14:00) (13 - 20)  SpO2: 100% (01 Mar 2019 14:00) (98% - 100%)  I&O's Summary    28 Feb 2019 07:01  -  01 Mar 2019 07:00  --------------------------------------------------------  IN: 400 mL / OUT: 1700 mL / NET: -1300 mL    01 Mar 2019 07:01  -  01 Mar 2019 15:25  --------------------------------------------------------  IN: 100 mL / OUT: 460 mL / NET: -360 mL        [x ] NO APPARENT ANESTHESIA COMPLICATIONS      Comments:

## 2019-03-02 LAB
ANION GAP SERPL CALC-SCNC: 9 MMO/L — SIGNIFICANT CHANGE UP (ref 7–14)
BLD GP AB SCN SERPL QL: NEGATIVE — SIGNIFICANT CHANGE UP
BUN SERPL-MCNC: 10 MG/DL — SIGNIFICANT CHANGE UP (ref 7–23)
CALCIUM SERPL-MCNC: 8.1 MG/DL — LOW (ref 8.4–10.5)
CHLORIDE SERPL-SCNC: 104 MMOL/L — SIGNIFICANT CHANGE UP (ref 98–107)
CO2 SERPL-SCNC: 26 MMOL/L — SIGNIFICANT CHANGE UP (ref 22–31)
CREAT SERPL-MCNC: 0.83 MG/DL — SIGNIFICANT CHANGE UP (ref 0.5–1.3)
GLUCOSE SERPL-MCNC: 88 MG/DL — SIGNIFICANT CHANGE UP (ref 70–99)
HCT VFR BLD CALC: 29.5 % — LOW (ref 34.5–45)
HGB BLD-MCNC: 9.7 G/DL — LOW (ref 11.5–15.5)
MAGNESIUM SERPL-MCNC: 2.1 MG/DL — SIGNIFICANT CHANGE UP (ref 1.6–2.6)
MCHC RBC-ENTMCNC: 28.7 PG — SIGNIFICANT CHANGE UP (ref 27–34)
MCHC RBC-ENTMCNC: 32.9 % — SIGNIFICANT CHANGE UP (ref 32–36)
MCV RBC AUTO: 87.3 FL — SIGNIFICANT CHANGE UP (ref 80–100)
NRBC # FLD: 0 K/UL — LOW (ref 25–125)
PHOSPHATE SERPL-MCNC: 1.7 MG/DL — LOW (ref 2.5–4.5)
PLATELET # BLD AUTO: 167 K/UL — SIGNIFICANT CHANGE UP (ref 150–400)
PMV BLD: 11 FL — SIGNIFICANT CHANGE UP (ref 7–13)
POTASSIUM SERPL-MCNC: 3.4 MMOL/L — LOW (ref 3.5–5.3)
POTASSIUM SERPL-SCNC: 3.4 MMOL/L — LOW (ref 3.5–5.3)
RBC # BLD: 3.38 M/UL — LOW (ref 3.8–5.2)
RBC # FLD: 13.1 % — SIGNIFICANT CHANGE UP (ref 10.3–14.5)
RH IG SCN BLD-IMP: POSITIVE — SIGNIFICANT CHANGE UP
SODIUM SERPL-SCNC: 139 MMOL/L — SIGNIFICANT CHANGE UP (ref 135–145)
WBC # BLD: 8.27 K/UL — SIGNIFICANT CHANGE UP (ref 3.8–10.5)
WBC # FLD AUTO: 8.27 K/UL — SIGNIFICANT CHANGE UP (ref 3.8–10.5)

## 2019-03-02 RX ORDER — OXYCODONE HYDROCHLORIDE 5 MG/1
5 TABLET ORAL EVERY 4 HOURS
Qty: 0 | Refills: 0 | Status: DISCONTINUED | OUTPATIENT
Start: 2019-03-02 | End: 2019-03-04

## 2019-03-02 RX ORDER — INFLUENZA VIRUS VACCINE 15; 15; 15; 15 UG/.5ML; UG/.5ML; UG/.5ML; UG/.5ML
0.5 SUSPENSION INTRAMUSCULAR ONCE
Qty: 0 | Refills: 0 | Status: DISCONTINUED | OUTPATIENT
Start: 2019-03-02 | End: 2019-03-04

## 2019-03-02 RX ORDER — OXYCODONE HYDROCHLORIDE 5 MG/1
10 TABLET ORAL EVERY 4 HOURS
Qty: 0 | Refills: 0 | Status: DISCONTINUED | OUTPATIENT
Start: 2019-03-02 | End: 2019-03-04

## 2019-03-02 RX ORDER — POTASSIUM PHOSPHATE, MONOBASIC POTASSIUM PHOSPHATE, DIBASIC 236; 224 MG/ML; MG/ML
30 INJECTION, SOLUTION INTRAVENOUS ONCE
Qty: 0 | Refills: 0 | Status: COMPLETED | OUTPATIENT
Start: 2019-03-02 | End: 2019-03-02

## 2019-03-02 RX ORDER — ACETAMINOPHEN 500 MG
650 TABLET ORAL EVERY 6 HOURS
Qty: 0 | Refills: 0 | Status: DISCONTINUED | OUTPATIENT
Start: 2019-03-02 | End: 2019-03-04

## 2019-03-02 RX ADMIN — Medication 1000 MILLIGRAM(S): at 14:15

## 2019-03-02 RX ADMIN — Medication 400 MILLIGRAM(S): at 07:28

## 2019-03-02 RX ADMIN — BENZOCAINE AND MENTHOL 1 LOZENGE: 5; 1 LIQUID ORAL at 04:45

## 2019-03-02 RX ADMIN — ENOXAPARIN SODIUM 40 MILLIGRAM(S): 100 INJECTION SUBCUTANEOUS at 12:07

## 2019-03-02 RX ADMIN — Medication 650 MILLIGRAM(S): at 18:52

## 2019-03-02 RX ADMIN — Medication 1000 MILLIGRAM(S): at 01:00

## 2019-03-02 RX ADMIN — BENZOCAINE AND MENTHOL 1 LOZENGE: 5; 1 LIQUID ORAL at 07:52

## 2019-03-02 RX ADMIN — Medication 400 MILLIGRAM(S): at 00:44

## 2019-03-02 RX ADMIN — POTASSIUM PHOSPHATE, MONOBASIC POTASSIUM PHOSPHATE, DIBASIC 85 MILLIMOLE(S): 236; 224 INJECTION, SOLUTION INTRAVENOUS at 10:28

## 2019-03-02 RX ADMIN — Medication 1000 MILLIGRAM(S): at 07:52

## 2019-03-02 RX ADMIN — PANTOPRAZOLE SODIUM 40 MILLIGRAM(S): 20 TABLET, DELAYED RELEASE ORAL at 12:07

## 2019-03-02 RX ADMIN — Medication 400 MILLIGRAM(S): at 13:15

## 2019-03-02 NOTE — PROGRESS NOTE ADULT - SUBJECTIVE AND OBJECTIVE BOX
B Team Surgery Progress Note    Interval: Patient was found to have a drop in her H/H yesterday from 11.5/36.8 preop to 7.5/23.3 yesterday. She was also slightly tachycardic 100-103 throughout the day. She was transfused 2 units pRBCs with  appropriate rise in H/H. No acute overnight events.    SUBJECTIVE: Patient seen and examined at the bedside. Feeling well this morning. Has been NPO with NGT and denies nausea. She experiences abdominal pain mostly around the midline wound that has been responsive to pain medication. Has passed flatus, has not passed a bowel movement. Has not been out of bed.    VITALS  T(C): 37.1 (03-01-19 @ 22:27), Max: 37.1 (03-01-19 @ 22:27)  HR: 95 (03-01-19 @ 22:27) (89 - 103)  BP: 102/55 (03-01-19 @ 22:27) (92/46 - 106/56)  RR: 17 (03-01-19 @ 22:27) (17 - 20)  SpO2: 100% (03-01-19 @ 22:27) (98% - 100%)  CAPILLARY BLOOD GLUCOSE    Is/Os    02-28 @ 07:01  -  03-01 @ 07:00  --------------------------------------------------------  IN:    lactated ringers.: 400 mL  Total IN: 400 mL    OUT:    Indwelling Catheter - Urethral: 1500 mL    Nasoenteral Tube: 200 mL  Total OUT: 1700 mL    Total NET: -1300 mL      03-01 @ 07:01  -  03-02 @ 01:41  --------------------------------------------------------  IN:    IV PiggyBack: 100 mL  Total IN: 100 mL    OUT:    Indwelling Catheter - Urethral: 400 mL    Nasoenteral Tube: 220 mL    Voided: 300 mL  Total OUT: 920 mL    Total NET: -820 mL    PHYSICAL EXAM:   General: NAD, Lying in bed comfortably, alert, oriented x3 NGT in place with bilious output  Pulm: Non-labored breathing on RA  GI/Abd: Softly distended, tender around the midline incision, midline wound clean and dry with dermabond in place, no rebound/guarding    MEDICATIONS (STANDING): acetaminophen  IVPB .. 1000 milliGRAM(s) IV Intermittent once  acetaminophen  IVPB .. 1000 milliGRAM(s) IV Intermittent once  dextrose 5% + sodium chloride 0.45% 1000 milliLiter(s) IV Continuous <Continuous>  enoxaparin Injectable 40 milliGRAM(s) SubCutaneous daily  pantoprazole  Injectable 40 milliGRAM(s) IV Push daily    MEDICATIONS (PRN):HYDROmorphone  Injectable 0.5 milliGRAM(s) IV Push every 4 hours PRN breakthrough pain    LABS  CBC (03-01 @ 22:19)                              9.5<L>                         9.09    )----------------(  153        --    % Neutrophils, --    % Lymphocytes, ANC: --                                  28.1<L>  CBC (03-01 @ 10:00)                              7.5<L>                         6.90    )----------------(  189        --    % Neutrophils, --    % Lymphocytes, ANC: --                                  23.3<L>    BMP (03-01 @ 06:48)             138     |  103     |  12    		Ca++ --      Ca 7.8<L>             ---------------------------------( 115<H>		Mg 1.6                3.5     |  26      |  0.91  			Ph 3.3     BMP (02-28 @ 13:50)             134<L>  |  100     |  8     		Ca++ --      Ca 8.3<L>             ---------------------------------( 128<H>		Mg 1.3<L>             4.1     |  20<L>   |  0.70  			Ph 3.7         Coags (03-01 @ 22:19)  aPTT 30.0 / INR 1.17 / PT 13.0      ABG (03-01 @ 07:21)      /  /  /  /  / %     Lactate:  1.0  ABG (02-28 @ 13:50)      /  /  /  /  / %     Lactate:  2.3<H>

## 2019-03-03 ENCOUNTER — TRANSCRIPTION ENCOUNTER (OUTPATIENT)
Age: 44
End: 2019-03-03

## 2019-03-03 LAB
ANION GAP SERPL CALC-SCNC: 12 MMO/L — SIGNIFICANT CHANGE UP (ref 7–14)
BUN SERPL-MCNC: 6 MG/DL — LOW (ref 7–23)
CALCIUM SERPL-MCNC: 8.8 MG/DL — SIGNIFICANT CHANGE UP (ref 8.4–10.5)
CHLORIDE SERPL-SCNC: 103 MMOL/L — SIGNIFICANT CHANGE UP (ref 98–107)
CO2 SERPL-SCNC: 25 MMOL/L — SIGNIFICANT CHANGE UP (ref 22–31)
CREAT SERPL-MCNC: 0.78 MG/DL — SIGNIFICANT CHANGE UP (ref 0.5–1.3)
GLUCOSE SERPL-MCNC: 97 MG/DL — SIGNIFICANT CHANGE UP (ref 70–99)
HCT VFR BLD CALC: 33.1 % — LOW (ref 34.5–45)
HGB BLD-MCNC: 10.8 G/DL — LOW (ref 11.5–15.5)
MAGNESIUM SERPL-MCNC: 2.2 MG/DL — SIGNIFICANT CHANGE UP (ref 1.6–2.6)
MCHC RBC-ENTMCNC: 29 PG — SIGNIFICANT CHANGE UP (ref 27–34)
MCHC RBC-ENTMCNC: 32.6 % — SIGNIFICANT CHANGE UP (ref 32–36)
MCV RBC AUTO: 88.7 FL — SIGNIFICANT CHANGE UP (ref 80–100)
NRBC # FLD: 0 K/UL — LOW (ref 25–125)
PHOSPHATE SERPL-MCNC: 2.9 MG/DL — SIGNIFICANT CHANGE UP (ref 2.5–4.5)
PLATELET # BLD AUTO: 208 K/UL — SIGNIFICANT CHANGE UP (ref 150–400)
PMV BLD: 10.7 FL — SIGNIFICANT CHANGE UP (ref 7–13)
POTASSIUM SERPL-MCNC: 3.9 MMOL/L — SIGNIFICANT CHANGE UP (ref 3.5–5.3)
POTASSIUM SERPL-SCNC: 3.9 MMOL/L — SIGNIFICANT CHANGE UP (ref 3.5–5.3)
RBC # BLD: 3.73 M/UL — LOW (ref 3.8–5.2)
RBC # FLD: 13.5 % — SIGNIFICANT CHANGE UP (ref 10.3–14.5)
SODIUM SERPL-SCNC: 140 MMOL/L — SIGNIFICANT CHANGE UP (ref 135–145)
WBC # BLD: 8.94 K/UL — SIGNIFICANT CHANGE UP (ref 3.8–10.5)
WBC # FLD AUTO: 8.94 K/UL — SIGNIFICANT CHANGE UP (ref 3.8–10.5)

## 2019-03-03 RX ORDER — ACETAMINOPHEN 500 MG
2 TABLET ORAL
Qty: 0 | Refills: 0 | COMMUNITY
Start: 2019-03-03

## 2019-03-03 RX ADMIN — Medication 650 MILLIGRAM(S): at 11:05

## 2019-03-03 RX ADMIN — Medication 650 MILLIGRAM(S): at 08:33

## 2019-03-03 RX ADMIN — Medication 650 MILLIGRAM(S): at 07:33

## 2019-03-03 RX ADMIN — Medication 650 MILLIGRAM(S): at 01:39

## 2019-03-03 RX ADMIN — ENOXAPARIN SODIUM 40 MILLIGRAM(S): 100 INJECTION SUBCUTANEOUS at 11:05

## 2019-03-03 RX ADMIN — Medication 650 MILLIGRAM(S): at 18:13

## 2019-03-03 RX ADMIN — Medication 650 MILLIGRAM(S): at 19:16

## 2019-03-03 RX ADMIN — OXYCODONE HYDROCHLORIDE 5 MILLIGRAM(S): 5 TABLET ORAL at 22:07

## 2019-03-03 RX ADMIN — Medication 650 MILLIGRAM(S): at 02:42

## 2019-03-03 RX ADMIN — Medication 650 MILLIGRAM(S): at 12:05

## 2019-03-03 NOTE — DISCHARGE NOTE ADULT - PATIENT PORTAL LINK FT
You can access the [facility] Patient Portal, offered by [facility], by registering with the following website: [email or web address]

## 2019-03-03 NOTE — DISCHARGE NOTE ADULT - CARE PROVIDER_API CALL
Wai Arambula)  Surgery; Surgical Critical Care  [id number] [place] [address]  Phone: [phone]  Fax: [phone]  Follow Up Time:

## 2019-03-03 NOTE — DISCHARGE NOTE ADULT - PLAN OF CARE
Surgical intervention, relief of bowel obstruction, pain control, diet tolerance WOUND CARE: The abdominal incision is covered with a blue skin glue that will eventually dry up and flake-off on its own. You may allow soapy water to run over the wound.   BATHING: Please do not submerge wound underwater. You may shower and/or sponge bathe.  ACTIVITY: No heavy lifting or straining. Otherwise, you may return to your usual level of physical activity. If you are taking narcotic pain medication (such as Oxycodone) DO NOT drive a car, operate machinery or make important decisions.  DIET: Return to your usual diet.  NOTIFY YOUR SURGEON IF: You have any bleeding that does not stop, any pus draining from your wound(s), any fever (over 100.4 F) or chills, persistent nausea/vomiting, persistent diarrhea, or if your pain is not controlled on your discharge pain medications.  FOLLOW-UP: Please follow up with your primary care physician in one week regarding your hospitalization. WOUND CARE: The abdominal incision is covered with a blue skin glue that will eventually dry up and flake-off on its own. You may allow soapy water to run over the wound. **Apply warm compress to incision every few hours.   BATHING: Please do not submerge wound underwater. You may shower and/or sponge bathe.  ACTIVITY: No heavy lifting or straining. Otherwise, you may return to your usual level of physical activity. If you are taking narcotic pain medication (such as Oxycodone) DO NOT drive a car, operate machinery or make important decisions.  DIET: Return to your usual diet.  NOTIFY YOUR SURGEON IF: You have any bleeding that does not stop, any pus draining from your wound(s), any fever (over 100.4 F) or chills, persistent nausea/vomiting, persistent diarrhea, or if your pain is not controlled on your discharge pain medications.  FOLLOW-UP: Please follow up with your primary care physician in one week regarding your hospitalization.

## 2019-03-03 NOTE — PROGRESS NOTE ADULT - SUBJECTIVE AND OBJECTIVE BOX
B Team Surgery Progress Note    Interval: NGT and Alves were removed yesterday. Diet advanced to CLD. No further transfusions necessary. No acute overnight events.    SUBJECTIVE: Patient seen and examined at the bedside. Feeling well this morning. Tolerating clear liquids without nausea or vomiting. Pain is well controlled. Has passed flatus has not passed a bowel movement.     VITALS  T(C): 37.1 (03-03-19 @ 01:32), Max: 37.6 (03-02-19 @ 21:14)  HR: 93 (03-03-19 @ 01:32) (90 - 97)  BP: 109/66 (03-03-19 @ 01:32) (108/70 - 115/60)  RR: 20 (03-03-19 @ 01:32) (16 - 20)  SpO2: 100% (03-03-19 @ 01:32) (96% - 100%)    Is/Os    03-01 @ 07:01  -  03-02 @ 07:00  --------------------------------------------------------  IN:    IV PiggyBack: 100 mL  Total IN: 100 mL    OUT:    Indwelling Catheter - Urethral: 600 mL    Nasoenteral Tube: 420 mL    Voided: 300 mL  Total OUT: 1320 mL    Total NET: -1220 mL      03-02 @ 07:01  -  03-03 @ 06:26  --------------------------------------------------------  IN:  Total IN: 0 mL    OUT:    Indwelling Catheter - Urethral: 500 mL    Voided: 550 mL  Total OUT: 1050 mL    Total NET: -1050 mL    PHYSICAL EXAM:   General: NAD, Lying in bed comfortably, alert, oriented x3  Pulm: Non-labored breathing on RA  GI/Abd: Soft, mild tenderness over the midline incisions, minimal distension, no rebound/guarding, midline incision clean and dry with dermabond in place    MEDICATIONS (STANDING): acetaminophen   Tablet .. 650 milliGRAM(s) Oral every 6 hours  dextrose 5% + sodium chloride 0.45% 1000 milliLiter(s) IV Continuous <Continuous>  enoxaparin Injectable 40 milliGRAM(s) SubCutaneous daily  influenza   Vaccine 0.5 milliLiter(s) IntraMuscular once    MEDICATIONS (PRN):oxyCODONE    IR 5 milliGRAM(s) Oral every 4 hours PRN Moderate Pain (4 - 6)  oxyCODONE    IR 10 milliGRAM(s) Oral every 4 hours PRN Severe Pain (7 - 10)    LABS  CBC (03-02 @ 05:58)                              9.7<L>                         8.27    )----------------(  167        --    % Neutrophils, --    % Lymphocytes, ANC: --                                  29.5<L>  CBC (03-01 @ 22:19)                              9.5<L>                         9.09    )----------------(  153        --    % Neutrophils, --    % Lymphocytes, ANC: --                                  28.1<L>    BMP (03-02 @ 05:58)             139     |  104     |  10    		Ca++ --      Ca 8.1<L>             ---------------------------------( 88    		Mg 2.1                3.4<L>  |  26      |  0.83  			Ph 1.7<L>      Coags (03-01 @ 22:19)  aPTT 30.0 / INR 1.17 / PT 13.0          IMAGING STUDIES

## 2019-03-03 NOTE — DISCHARGE NOTE ADULT - CARE PLAN
Principal Discharge DX:	Intestinal adhesions with complete obstruction  Goal:	Surgical intervention, relief of bowel obstruction, pain control, diet tolerance  Assessment and plan of treatment:	WOUND CARE: The abdominal incision is covered with a blue skin glue that will eventually dry up and flake-off on its own. You may allow soapy water to run over the wound.   BATHING: Please do not submerge wound underwater. You may shower and/or sponge bathe.  ACTIVITY: No heavy lifting or straining. Otherwise, you may return to your usual level of physical activity. If you are taking narcotic pain medication (such as Oxycodone) DO NOT drive a car, operate machinery or make important decisions.  DIET: Return to your usual diet.  NOTIFY YOUR SURGEON IF: You have any bleeding that does not stop, any pus draining from your wound(s), any fever (over 100.4 F) or chills, persistent nausea/vomiting, persistent diarrhea, or if your pain is not controlled on your discharge pain medications.  FOLLOW-UP: Please follow up with your primary care physician in one week regarding your hospitalization. Principal Discharge DX:	Intestinal adhesions with complete obstruction  Goal:	Surgical intervention, relief of bowel obstruction, pain control, diet tolerance  Assessment and plan of treatment:	WOUND CARE: The abdominal incision is covered with a blue skin glue that will eventually dry up and flake-off on its own. You may allow soapy water to run over the wound. **Apply warm compress to incision every few hours.   BATHING: Please do not submerge wound underwater. You may shower and/or sponge bathe.  ACTIVITY: No heavy lifting or straining. Otherwise, you may return to your usual level of physical activity. If you are taking narcotic pain medication (such as Oxycodone) DO NOT drive a car, operate machinery or make important decisions.  DIET: Return to your usual diet.  NOTIFY YOUR SURGEON IF: You have any bleeding that does not stop, any pus draining from your wound(s), any fever (over 100.4 F) or chills, persistent nausea/vomiting, persistent diarrhea, or if your pain is not controlled on your discharge pain medications.  FOLLOW-UP: Please follow up with your primary care physician in one week regarding your hospitalization.

## 2019-03-03 NOTE — DISCHARGE NOTE ADULT - MEDICATION SUMMARY - MEDICATIONS TO TAKE
I will START or STAY ON the medications listed below when I get home from the hospital:    acetaminophen 325 mg oral tablet  -- 2 tab(s) by mouth every 6 hours  -- Indication: For pain    oxyCODONE 5 mg oral tablet  -- 1 tab(s) by mouth every 4 hours, As needed, Moderate Pain (4 - 6) MDD:6  -- Indication: For pain

## 2019-03-04 VITALS
RESPIRATION RATE: 17 BRPM | SYSTOLIC BLOOD PRESSURE: 106 MMHG | DIASTOLIC BLOOD PRESSURE: 71 MMHG | OXYGEN SATURATION: 98 % | HEART RATE: 99 BPM | TEMPERATURE: 99 F

## 2019-03-04 RX ORDER — OXYCODONE HYDROCHLORIDE 5 MG/1
1 TABLET ORAL
Qty: 18 | Refills: 0 | OUTPATIENT
Start: 2019-03-04 | End: 2019-03-06

## 2019-03-04 RX ADMIN — Medication 650 MILLIGRAM(S): at 06:08

## 2019-03-04 RX ADMIN — Medication 650 MILLIGRAM(S): at 00:55

## 2019-03-04 RX ADMIN — Medication 650 MILLIGRAM(S): at 01:45

## 2019-03-04 RX ADMIN — OXYCODONE HYDROCHLORIDE 5 MILLIGRAM(S): 5 TABLET ORAL at 00:30

## 2019-03-04 RX ADMIN — Medication 650 MILLIGRAM(S): at 06:40

## 2019-03-04 NOTE — PROGRESS NOTE ADULT - ASSESSMENT
ASSESSMENT  43F w/ hx of open myomectomy and appendectomy, presented with close loop SBO, now s/p ex-lap RONIT, SBR (~120 cm frankly necrotic distal small bowel) side to side anastomosis 15 cm from IC valve and uterine submucosal myomectomy of involved fibroid w/ hysterorrhaphy POD1.     PLAN  - NPO, IVF  - NGT clamp trial  - TOV  - Pain control as needed  - LVNX for DVT ppx  - OOB and ambulating as tolerated  - F/u AM labs  - Monitor GI function      B Surgery [id number].
ASSESSMENT  43F w/ hx of open myomectomy and appendectomy, presented with closed loop SBO, now s/p ex-lap RONIT, SBR (~120 cm frankly necrotic distal small bowel) side to side anastomosis 15 cm from IC valve and uterine submucosal myomectomy of involved fibroid w/ hysterorrhaphy POD2, s/p 2 Units pRBCs.     PLAN  - NPO, IVF  - NGT clamp trial  - TOV  - Pain control as needed  - LVNX for DVT ppx  - OOB and ambulating as tolerated  - F/u AM labs  - Monitor GI function      B Surgery [id number].
ASSESSMENT  43F w/ hx of open myomectomy and appendectomy, presented with closed loop SBO, now s/p ex-lap RONIT, SBR (~120 cm frankly necrotic distal small bowel) side to side anastomosis 15 cm from IC valve and uterine submucosal myomectomy of involved fibroid w/ hysterorrhaphy POD3, now tolerating CLD    PLAN  - CLD, monitor GI function and advance as tolerated  - Pain control as needed  - LVNX for DVT ppx  - OOB and ambulating as tolerated  - F/u AM labs  - Monitor GI function      B Surgery [id number].
ASSESSMENT  43F w/ hx of open myomectomy and appendectomy, presented with closed loop SBO, now s/p ex-lap RONIT, SBR (~120 cm frankly necrotic distal small bowel) side to side anastomosis 15 cm from IC valve and uterine submucosal myomectomy of involved fibroid w/ hysterorrhaphy POD3, now tolerating CLD    PLAN  - RD  - Pain control as needed  - LVNX for DVT ppx  - OOB and ambulating as tolerated  - F/u AM labs  - Monitor GI function  - Dispo planning today      B Surgery [id number].

## 2019-03-04 NOTE — PROGRESS NOTE ADULT - REASON FOR ADMISSION
Closed loop small bowel obstruction

## 2019-03-04 NOTE — PROGRESS NOTE ADULT - SUBJECTIVE AND OBJECTIVE BOX
Surgery Progress Note    S:     Patient seen and examined. No acute events overnight. Feeling well this morning, pain controlled, passing flatus and bowel movements, tolerating regular diet yesterday.    O:    Vital Signs Last 24 Hrs  T(C): 36.7 (04 Mar 2019 06:06), Max: 37 (03 Mar 2019 10:00)  T(F): 98 (04 Mar 2019 06:06), Max: 98.6 (03 Mar 2019 10:00)  HR: 75 (04 Mar 2019 06:06) (73 - 88)  BP: 109/69 (04 Mar 2019 06:06) (109/69 - 125/76)  BP(mean): --  RR: 18 (04 Mar 2019 06:06) (16 - 18)  SpO2: 100% (04 Mar 2019 06:06) (100% - 100%)    Physical Exam:  Gen: NAD  Resp: Unlabored breathing  Abd: soft, NTND, no rebound or guarding, midline incision c/d/i, some tenderness to palpation in superior portion of wound  Ext: WWP  Skin: No rashes    I&O's Detail    03 Mar 2019 07:01  -  04 Mar 2019 07:00  --------------------------------------------------------  IN:    Oral Fluid: 840 mL  Total IN: 840 mL    OUT:    Voided: 1450 mL  Total OUT: 1450 mL    Total NET: -610 mL          MEDICATIONS  (STANDING):  acetaminophen   Tablet .. 650 milliGRAM(s) Oral every 6 hours  enoxaparin Injectable 40 milliGRAM(s) SubCutaneous daily  influenza   Vaccine 0.5 milliLiter(s) IntraMuscular once    MEDICATIONS  (PRN):  benzocaine 15 mG/menthol 3.6 mG Lozenge 1 Lozenge Oral three times a day PRN Sore Throat  oxyCODONE    IR 5 milliGRAM(s) Oral every 4 hours PRN Moderate Pain (4 - 6)  oxyCODONE    IR 10 milliGRAM(s) Oral every 4 hours PRN Severe Pain (7 - 10)      Labs:                          10.8   8.94  )-----------( 208      ( 03 Mar 2019 07:15 )             33.1       03-03    140  |  103  |  6<L>  ----------------------------<  97  3.9   |  25  |  0.78    Ca    8.8      03 Mar 2019 07:15  Phos  2.9     03-03  Mg     2.2     03-03        Radiology: no new imaging

## 2019-03-04 NOTE — PROGRESS NOTE ADULT - ATTENDING COMMENTS
I have personally interviewed and examined this patient, reviewed pertinent labs and imaging, and discussed the case with colleagues, residents, and physician assistants on B Team rounds.    abd soft, incision intact with dermabond, mild fluctuance right upper side of wound without warmth/erythema/drainage    The active care issues are:  1. resolved SELVIN-tolerating diet  2. incisional seroma    May discharge home today with close out-patient follow-up (office appt this Thurs) for wound check.  Patient understands and agrees.    The Acute Care Surgery (B Team) Attending Group Practice:  Dr. Roseann Nogueira, Dr. Wai Arambula, Dr. Eden Lipscomb, Dr. Rylan Curtis    urgent issues - spectra [phone] or [phone]  nonurgent issues - [phone]  patient appointments or afterhours - [phone]
Seen and examined, chart and note reviewed, case discussed with B team    Ischemic small bowel obstruction s/p small bowel resection, appendectomy and submucosal myomectomy    Received 1 unit of pRBC overnight.  Denies nausea, vomiting and notes passage of flatus.      AOX3 not in distress  Incision clean dry and intact, appropriately tender at incision    a.  Clears  b.  Decrease IVF  c.  Change to percocet for pain  d.  DVT prophylaxis
I saw and examined the patient and agree with the above note.    Rylan Curtis MD (Cell: [phone])  Acute and Critical Care Surgery    The Acute Care Surgery (B Team) Attending Group Practice:  Dr. Roseann Nogueira, Dr. Wai Arambula, Dr. Eden Lipscomb, Dr. Rylan Curtis    Urgent issues - spectra [phone] or [phone]  Nonurgent issues - [phone]  Patient appointments or after hours - [phone]

## 2019-03-05 LAB — ANTI-MULLERIAN HORMONE: 0.32 — SIGNIFICANT CHANGE UP

## 2019-03-05 NOTE — DISCUSSION/SUMMARY
[FreeTextEntry1] : Pt a/w SBO s/p ex lap w/ necrotic bowel excision. Discharged on 3/4. Will task  for hospital discharge appointment next week.\par \par WW

## 2019-03-07 ENCOUNTER — APPOINTMENT (OUTPATIENT)
Dept: TRAUMA SURGERY | Facility: CLINIC | Age: 44
End: 2019-03-07
Payer: COMMERCIAL

## 2019-03-07 VITALS
HEART RATE: 116 BPM | BODY MASS INDEX: 23.19 KG/M2 | SYSTOLIC BLOOD PRESSURE: 104 MMHG | TEMPERATURE: 98.6 F | DIASTOLIC BLOOD PRESSURE: 74 MMHG | HEIGHT: 62 IN | WEIGHT: 126 LBS

## 2019-03-07 DIAGNOSIS — Z98.890 DIARRHEA, UNSPECIFIED: ICD-10-CM

## 2019-03-07 DIAGNOSIS — R19.7 DIARRHEA, UNSPECIFIED: ICD-10-CM

## 2019-03-07 PROBLEM — D21.9 BENIGN NEOPLASM OF CONNECTIVE AND OTHER SOFT TISSUE, UNSPECIFIED: Chronic | Status: ACTIVE | Noted: 2019-02-27

## 2019-03-07 PROCEDURE — 99024 POSTOP FOLLOW-UP VISIT: CPT

## 2019-03-28 ENCOUNTER — APPOINTMENT (OUTPATIENT)
Dept: OBGYN | Facility: HOSPITAL | Age: 44
End: 2019-03-28

## 2019-03-28 ENCOUNTER — APPOINTMENT (OUTPATIENT)
Dept: TRAUMA SURGERY | Facility: CLINIC | Age: 44
End: 2019-03-28
Payer: COMMERCIAL

## 2019-03-28 VITALS
TEMPERATURE: 98.1 F | BODY MASS INDEX: 23 KG/M2 | HEIGHT: 62 IN | DIASTOLIC BLOOD PRESSURE: 67 MMHG | WEIGHT: 125 LBS | HEART RATE: 99 BPM | SYSTOLIC BLOOD PRESSURE: 106 MMHG

## 2019-03-28 PROCEDURE — 99024 POSTOP FOLLOW-UP VISIT: CPT

## 2019-04-06 ENCOUNTER — OUTPATIENT (OUTPATIENT)
Dept: OUTPATIENT SERVICES | Facility: HOSPITAL | Age: 44
LOS: 1 days | End: 2019-04-06
Payer: COMMERCIAL

## 2019-04-06 ENCOUNTER — APPOINTMENT (OUTPATIENT)
Dept: MRI IMAGING | Facility: IMAGING CENTER | Age: 44
End: 2019-04-06

## 2019-04-06 DIAGNOSIS — R10.2 PELVIC AND PERINEAL PAIN: ICD-10-CM

## 2019-04-06 PROCEDURE — 72197 MRI PELVIS W/O & W/DYE: CPT | Mod: 26

## 2019-04-06 PROCEDURE — A9585: CPT

## 2019-04-06 PROCEDURE — 72197 MRI PELVIS W/O & W/DYE: CPT

## 2019-04-09 ENCOUNTER — APPOINTMENT (OUTPATIENT)
Dept: OBGYN | Facility: HOSPITAL | Age: 44
End: 2019-04-09
Payer: COMMERCIAL

## 2019-04-09 ENCOUNTER — OUTPATIENT (OUTPATIENT)
Dept: OUTPATIENT SERVICES | Facility: HOSPITAL | Age: 44
LOS: 1 days | End: 2019-04-09

## 2019-04-09 VITALS
HEIGHT: 62 IN | HEART RATE: 95 BPM | DIASTOLIC BLOOD PRESSURE: 77 MMHG | SYSTOLIC BLOOD PRESSURE: 114 MMHG | WEIGHT: 124 LBS | BODY MASS INDEX: 22.82 KG/M2

## 2019-04-09 DIAGNOSIS — N97.9 FEMALE INFERTILITY, UNSPECIFIED: ICD-10-CM

## 2019-04-09 DIAGNOSIS — N80.9 ENDOMETRIOSIS, UNSPECIFIED: ICD-10-CM

## 2019-04-09 PROCEDURE — 99213 OFFICE O/P EST LOW 20 MIN: CPT | Mod: GC

## 2019-04-09 RX ORDER — DOXYCYCLINE HYCLATE 100 MG/1
100 TABLET ORAL TWICE DAILY
Qty: 6 | Refills: 0 | Status: ACTIVE | COMMUNITY
Start: 2019-04-09 | End: 1900-01-01

## 2019-04-10 DIAGNOSIS — N80.9 ENDOMETRIOSIS, UNSPECIFIED: ICD-10-CM

## 2019-04-10 DIAGNOSIS — N97.9 FEMALE INFERTILITY, UNSPECIFIED: ICD-10-CM

## 2019-04-25 ENCOUNTER — LABORATORY RESULT (OUTPATIENT)
Age: 44
End: 2019-04-25

## 2019-04-25 LAB
T4 FREE SERPL-MCNC: 1.26 NG/DL — SIGNIFICANT CHANGE UP (ref 0.9–1.8)
TSH SERPL-MCNC: 1.72 UIU/ML — SIGNIFICANT CHANGE UP (ref 0.27–4.2)

## 2019-04-26 LAB
ESTRADIOL FREE SERPL-MCNC: 31 PG/ML — SIGNIFICANT CHANGE UP
FSH SERPL-MCNC: 15.8 IU/L — SIGNIFICANT CHANGE UP
LH SERPL-ACNC: 7.3 IU/L — SIGNIFICANT CHANGE UP
PROLACTIN SERPL-MCNC: 21.4 NG/ML — SIGNIFICANT CHANGE UP (ref 3.4–24.1)

## 2019-04-29 ENCOUNTER — APPOINTMENT (OUTPATIENT)
Dept: INTERNAL MEDICINE | Facility: CLINIC | Age: 44
End: 2019-04-29

## 2019-04-29 ENCOUNTER — APPOINTMENT (OUTPATIENT)
Age: 44
End: 2019-04-29

## 2019-04-30 ENCOUNTER — APPOINTMENT (OUTPATIENT)
Dept: INTERNAL MEDICINE | Facility: HOSPITAL | Age: 44
End: 2019-04-30

## 2019-05-22 ENCOUNTER — OUTPATIENT (OUTPATIENT)
Dept: OUTPATIENT SERVICES | Facility: HOSPITAL | Age: 44
LOS: 1 days | End: 2019-05-22

## 2019-05-22 ENCOUNTER — RESULT CHARGE (OUTPATIENT)
Age: 44
End: 2019-05-22

## 2019-05-22 ENCOUNTER — APPOINTMENT (OUTPATIENT)
Dept: OBGYN | Facility: HOSPITAL | Age: 44
End: 2019-05-22

## 2019-05-22 LAB — HCG UR QL: NEGATIVE

## 2019-05-24 ENCOUNTER — APPOINTMENT (OUTPATIENT)
Dept: RADIOLOGY | Facility: HOSPITAL | Age: 44
End: 2019-05-24
Payer: COMMERCIAL

## 2019-05-24 ENCOUNTER — OUTPATIENT (OUTPATIENT)
Dept: OUTPATIENT SERVICES | Facility: HOSPITAL | Age: 44
LOS: 1 days | End: 2019-05-24
Payer: COMMERCIAL

## 2019-05-24 DIAGNOSIS — N93.1 PRE-PUBERTAL VAGINAL BLEEDING: ICD-10-CM

## 2019-05-24 DIAGNOSIS — Z00.00 ENCOUNTER FOR GENERAL ADULT MEDICAL EXAMINATION WITHOUT ABNORMAL FINDINGS: ICD-10-CM

## 2019-05-24 DIAGNOSIS — N97.1 FEMALE INFERTILITY OF TUBAL ORIGIN: ICD-10-CM

## 2019-05-24 PROCEDURE — 74740 X-RAY FEMALE GENITAL TRACT: CPT

## 2019-05-24 PROCEDURE — 58340 CATHETER FOR HYSTEROGRAPHY: CPT

## 2019-05-24 PROCEDURE — 74740 X-RAY FEMALE GENITAL TRACT: CPT | Mod: 26

## 2019-05-28 DIAGNOSIS — Z32.02 ENCOUNTER FOR PREGNANCY TEST, RESULT NEGATIVE: ICD-10-CM

## 2019-06-04 ENCOUNTER — CHART COPY (OUTPATIENT)
Age: 44
End: 2019-06-04

## 2019-06-20 ENCOUNTER — APPOINTMENT (OUTPATIENT)
Dept: TRAUMA SURGERY | Facility: CLINIC | Age: 44
End: 2019-06-20
Payer: COMMERCIAL

## 2019-06-20 VITALS
HEIGHT: 62 IN | TEMPERATURE: 98.3 F | HEART RATE: 76 BPM | SYSTOLIC BLOOD PRESSURE: 103 MMHG | BODY MASS INDEX: 22.08 KG/M2 | DIASTOLIC BLOOD PRESSURE: 71 MMHG | WEIGHT: 120 LBS

## 2019-06-20 DIAGNOSIS — R10.13 EPIGASTRIC PAIN: ICD-10-CM

## 2019-06-20 DIAGNOSIS — K56.609 UNSPECIFIED INTESTINAL OBSTRUCTION, UNSPECIFIED AS TO PARTIAL VERSUS COMPLETE OBSTRUCTION: ICD-10-CM

## 2019-06-20 PROCEDURE — 99213 OFFICE O/P EST LOW 20 MIN: CPT

## 2019-06-20 NOTE — ASSESSMENT
[FreeTextEntry1] : s/p small bowel resection for closed loop obstruction\par \par Patient notes occasional epigastric pain described as burning especially after food intake and at night.\par Denies fever, nausea, vomiting\par Tolerating diet with good bowel function\par \par AOX3\par Incision healed no hernia notes\par Soft nontender nondistended\par \par Avoid spicy food, carbonated drinks\par Space meal with bed rest\par Trial oF otc prilosec\par May need GI evaluation\par Follow up in 1-2 months

## 2021-04-13 ENCOUNTER — RESULT REVIEW (OUTPATIENT)
Age: 46
End: 2021-04-13

## 2021-06-21 NOTE — ED PROVIDER NOTE - [facility] ED MD DISPO DIVISION
DASH Eating Plan   WHAT YOU NEED TO KNOW:   The DASH (Dietary Approaches to Stop Hypertension) Eating Plan is designed to help prevent or lower high blood pressure  It can also help to lower LDL (bad) cholesterol and decrease your risk of heart disease  The plan is low in sodium, sugar, unhealthy fats, and total fat  It is high in potassium, calcium, magnesium, and fiber  These nutrients are added when you eat more fruits, vegetables, and whole grains  DISCHARGE INSTRUCTIONS:   Your sodium limit each day: Your dietitian will tell you how much sodium is safe for you to have each day  People with high blood pressure should have no more than 1,500 to 2,300 mg of sodium in a day  A teaspoon (tsp) of salt has 2,300 mg of sodium  This may seem like a difficult goal, but small changes to the foods you eat can make a big difference  Your healthcare provider or dietitian can help you create a meal plan that follows your sodium limit  How to limit sodium:   · Read food labels  Food labels can help you choose foods that are low in sodium  The amount of sodium is listed in milligrams (mg)  The % Daily Value (DV) column tells you how much of your daily needs are met by 1 serving of the food for each nutrient listed  Choose foods that have less than 5% of the DV of sodium  These foods are considered low in sodium  Foods that have 20% or more of the DV of sodium are considered high in sodium  Avoid foods that have more than 300 mg of sodium in each serving  Choose foods that say low-sodium, reduced-sodium, or no salt added on the food label  · Avoid salt  Do not salt food at the table, and add very little salt to foods during cooking  Use herbs and spices, such as onions, garlic, and salt-free seasonings to add flavor to foods  Try lemon or lime juice or vinegar to give foods a tart flavor  Use hot peppers or a small amount of hot pepper sauce to add a spicy flavor to foods  · Ask about salt substitutes    Ask your healthcare provider if you may use salt substitutes  Some salt substitutes have ingredients that can be harmful if you have certain health conditions  · Choose foods carefully at restaurants  Meals from restaurants, especially fast food restaurants, are often high in sodium  Some restaurants have nutrition information that tells you the amount of sodium in their foods  Ask to have your food prepared with less, or no salt  What you need to know about fats:   · Include healthy fats  Examples are unsaturated fats and omega-3 fatty acids  Unsaturated fats are found in soybean, canola, olive, or sunflower oil, and liquid and soft tub margarines  Omega-3 fatty acids are found in fatty fish, such as salmon, tuna, mackerel, and sardines  It is also found in flaxseed oil and ground flaxseed  · Avoid unhealthy fats  Do not eat unhealthy fats, such as saturated fats and trans fats  Saturated fats are found in foods that contain fat from animals  Examples are fatty meats, whole milk, butter, cream, and other dairy foods  It is also found in shortening, stick margarine, palm oil, and coconut oil  Trans fats are found in fried foods, crackers, chips, and baked goods made with margarine or shortening  Foods to include: With the DASH eating plan, you need to eat a certain number of servings from each food group  This will help you get enough of certain nutrients and limit others  The amount of servings you should eat depends on how many calories you need  Your dietitian can tell you how many calories you need  The number of servings listed next to the food groups below are for people who need about 2,000 calories each day  · Grains:  6 to 8 servings (3 of these servings should be whole-grain foods)    ? 1 slice of whole-grain bread     ? 1 ounce of dry cereal    ? ½ cup of cooked cereal, pasta, or brown rice    · Vegetables and fruits:  4 to 5 servings of fruits and 4 to 5 servings of vegetables    ?  1 medium fruit    ? ½ cup of frozen, canned (no added salt), or chopped fresh vegetables     ? ½ cup of fresh, frozen, dried, or canned fruit (canned in light syrup or fruit juice)    ? ½ cup of vegetable or fruit juice    · Dairy:  2 to 3 servings    ? 1 cup of nonfat (skim) or 1% milk    ? 1½ ounces of fat-free or low-fat cheese    ? 6 ounces of nonfat or low-fat yogurt    · Lean meat, poultry, and fish:  6 ounces or less    ? Poultry (chicken, turkey) with no skin    ? Fish (especially fatty fish, such as salmon, fresh tuna, or mackerel)    ? Lean beef and pork (loin, round, extra lean hamburger)    ? Egg whites and egg substitutes    · Nuts, seeds, and legumes:  4 to 5 servings each week    ? ½ cup of cooked beans and peas    ? 1½ ounces of unsalted nuts    ? 2 tablespoons of peanut butter or seeds    · Sweets and added sugars:  5 or less each week    ? 1 tablespoon of sugar, jelly, or jam    ? ½ cup of sorbet or gelatin    ? 1 cup of lemonade    · Fats:  2 to 3 servings each week    ? 1 teaspoon of soft margarine or vegetable oil    ? 1 tablespoon of mayonnaise    ? 2 tablespoons of salad dressing    Foods to avoid:   · Grains:      ? Baked goods, such as doughnuts, pastries, cookies, and biscuits (high in fat and sugar)    ? Mixes for cornbread and biscuits, packaged foods, such as bread stuffing, rice and pasta mixes, macaroni and cheese, and instant cereals (high in sodium)    · Fruits and vegetables:      ? Regular, canned vegetables (high in sodium)    ? Sauerkraut, pickled vegetables, and other foods prepared in brine (high in sodium)    ? Fried vegetables or vegetables in butter or high-fat sauces    ? Fruit in cream or butter sauce (high in fat)    · Dairy:      ? Whole milk, 2% milk, and cream (high in fat)    ?  Regular cheese and processed cheese (high in fat and sodium)    · Meats and protein foods:      ? Smoked or cured meat, such as corned beef, dietz, ham, hot dogs, and sausage (high in fat and sodium)    ? Canned beans and canned meats or spreads, such as potted meats, sardines, anchovies, and imitation seafood (high in sodium)    ? Deli or lunch meats, such as bologna, ham, turkey, and roast beef (high in sodium)    ? High-fat meat (T-bone steak, regular hamburger, and ribs)    ? Whole eggs and egg yolks (high in fat)    · Other:      ? Seasonings made with salt, such as garlic salt, celery salt, onion salt, seasoned salt, meat tenderizers, and monosodium glutamate (MSG)    ? Miso soup and canned or dried soup mixes (high in sodium)    ? Regular soy sauce, barbecue sauce, teriyaki sauce, steak sauce, Worcestershire sauce, and most flavored vinegars (high in sodium)    ? Regular condiments, such as mustard, ketchup, and salad dressings (high in sodium)    ? Gravy and sauces, such as Ole or cheese sauces (high in sodium and fat)    ? Drinks high in sugar, such as soda or fruit drinks    ? Snack foods, such as salted chips, popcorn, pretzels, pork rinds, salted crackers, and salted nuts    ? Frozen foods, such as dinners, entrees, vegetables with sauces, and breaded meats (high in sodium)    Other guidelines to follow:   · Maintain a healthy weight  Your risk for heart disease is higher if you are overweight  Your healthcare provider may suggest that you lose weight if you are overweight  You can lose weight by eating fewer calories and foods that have added sugars and fat  The DASH meal plan can help you do this  Decrease calories by eating smaller portions at each meal and fewer snacks  Ask your healthcare provider for more information about how to lose weight  · Exercise regularly  Regular exercise can help you reach or maintain a healthy weight  Regular exercise can also help decrease your blood pressure and improve your cholesterol levels  Get 30 minutes or more of moderate exercise each day of the week  To lose weight, get at least 60 minutes of exercise   Talk to your healthcare provider about the best exercise program for you  · Limit alcohol  Women should limit alcohol to 1 drink a day  Men should limit alcohol to 2 drinks a day  A drink of alcohol is 12 ounces of beer, 5 ounces of wine, or 1½ ounces of liquor  © Copyright [address] Information is for End User's use only and may not be sold, redistributed or otherwise used for commercial purposes  All illustrations and images included in CareNotes® are the copyrighted property of [employer] D [employer]  or [facility] Angelica Penny   The above information is an  only  It is not intended as medical advice for individual conditions or treatments  Talk to your doctor, nurse or pharmacist before following any medical regimen to see if it is safe and effective for you  BROCK

## 2021-12-14 NOTE — H&P ADULT - ASSESSMENT
43F with PMH of uterine fibroids s/p open myomectomy (2016) presenting with one day of severe abdominal pain, nausea, and emesis. CT scan shows dilated small bowel with hypoenhancement and two transition points, concerning for closed loop obstruction and ischemia.    Plan:  - Admit to surgery under Dr. Arambula  - NPO, IV fluids  - Emergent operative intervention was discussed with the patient and her  who expressed understanding and agree to proceed with surgery  - NG tube placed in ED  - Booked and consented for exploratory laparotomy, possible bowel resection, possible ostomy, possible temporary abdominal closure  - Discussed with attending    B team surgery  Pager [phone] Strong peripheral pulses

## 2023-09-12 ENCOUNTER — LABORATORY RESULT (OUTPATIENT)
Age: 48
End: 2023-09-12

## 2025-05-15 NOTE — ED ADULT NURSE NOTE - NS ED NURSE LEVEL OF CONSCIOUSNESS SPEECH
After taking IV out the PT stated she felt she was having an allergic reaction.  Reports tightness in throat.  Benadryl given. PT placed back on the monitor    
Speaking Coherently